# Patient Record
Sex: FEMALE | Race: WHITE | Employment: PART TIME | URBAN - METROPOLITAN AREA
[De-identification: names, ages, dates, MRNs, and addresses within clinical notes are randomized per-mention and may not be internally consistent; named-entity substitution may affect disease eponyms.]

---

## 2017-08-13 ENCOUNTER — APPOINTMENT (OUTPATIENT)
Dept: CT IMAGING | Age: 27
DRG: 871 | End: 2017-08-13
Attending: EMERGENCY MEDICINE
Payer: COMMERCIAL

## 2017-08-13 ENCOUNTER — HOSPITAL ENCOUNTER (INPATIENT)
Age: 27
LOS: 8 days | Discharge: HOME | DRG: 871 | End: 2017-08-22
Attending: EMERGENCY MEDICINE | Admitting: HOSPITALIST
Payer: COMMERCIAL

## 2017-08-13 DIAGNOSIS — R11.2 INTRACTABLE VOMITING WITH NAUSEA, UNSPECIFIED VOMITING TYPE: ICD-10-CM

## 2017-08-13 DIAGNOSIS — K85.90 ACUTE PANCREATITIS WITHOUT INFECTION OR NECROSIS, UNSPECIFIED PANCREATITIS TYPE: Primary | ICD-10-CM

## 2017-08-13 DIAGNOSIS — N30.00 ACUTE CYSTITIS WITHOUT HEMATURIA: ICD-10-CM

## 2017-08-13 LAB
ALBUMIN SERPL BCP-MCNC: 3.2 G/DL (ref 3.5–5)
ALBUMIN/GLOB SERPL: 0.6 {RATIO} (ref 1.2–3.5)
ALP SERPL-CCNC: 64 U/L (ref 50–136)
ALT SERPL-CCNC: 27 U/L (ref 12–65)
ANION GAP BLD CALC-SCNC: 15 MMOL/L (ref 7–16)
AST SERPL W P-5'-P-CCNC: 33 U/L (ref 15–37)
BACTERIA URNS QL MICRO: NORMAL /HPF
BILIRUB SERPL-MCNC: 0.7 MG/DL (ref 0.2–1.1)
BUN SERPL-MCNC: 6 MG/DL (ref 6–23)
CALCIUM SERPL-MCNC: 9.2 MG/DL (ref 8.3–10.4)
CASTS URNS QL MICRO: 0 /LPF
CHLORIDE SERPL-SCNC: 102 MMOL/L (ref 98–107)
CO2 SERPL-SCNC: 20 MMOL/L (ref 21–32)
CREAT BLD-MCNC: 0.6 MG/DL (ref 0.6–1)
CREAT SERPL-MCNC: 0.93 MG/DL (ref 0.6–1)
CRYSTALS URNS QL MICRO: 0 /LPF
DIFFERENTIAL METHOD BLD: ABNORMAL
EPI CELLS #/AREA URNS HPF: NORMAL /HPF
ERYTHROCYTE [DISTWIDTH] IN BLOOD BY AUTOMATED COUNT: 14.5 % (ref 11.9–14.6)
GLOBULIN SER CALC-MCNC: 5.4 G/DL (ref 2.3–3.5)
GLUCOSE SERPL-MCNC: 169 MG/DL (ref 65–100)
HCG UR QL: NEGATIVE
HCT VFR BLD AUTO: 38 % (ref 35.8–46.3)
HGB BLD-MCNC: 13.4 G/DL (ref 11.7–15.4)
LIPASE SERPL-CCNC: 1793 U/L (ref 73–393)
LYMPHOCYTES # BLD: 3.5 K/UL (ref 0.5–4.6)
LYMPHOCYTES NFR BLD MANUAL: 19 % (ref 16–44)
MCH RBC QN AUTO: 28.4 PG (ref 26.1–32.9)
MCHC RBC AUTO-ENTMCNC: 35.3 G/DL (ref 31.4–35)
MCV RBC AUTO: 80.5 FL (ref 79.6–97.8)
MONOCYTES # BLD: 1.7 K/UL (ref 0.1–1.3)
MONOCYTES NFR BLD MANUAL: 9 % (ref 3–9)
MUCOUS THREADS URNS QL MICRO: 0 /LPF
NEUTS BAND NFR BLD MANUAL: 10 % (ref 0–6)
NEUTS SEG # BLD: 13.2 K/UL (ref 1.7–8.2)
NEUTS SEG NFR BLD MANUAL: 62 % (ref 47–75)
OTHER OBSERVATIONS,UCOM: NORMAL
PLATELET # BLD AUTO: 174 K/UL (ref 150–450)
PLATELET COMMENTS,PCOM: ADEQUATE
PMV BLD AUTO: 11.5 FL (ref 10.8–14.1)
POTASSIUM SERPL-SCNC: 3.8 MMOL/L (ref 3.5–5.1)
PROT SERPL-MCNC: 8.6 G/DL (ref 6.3–8.2)
RBC # BLD AUTO: 4.72 M/UL (ref 4.05–5.25)
RBC #/AREA URNS HPF: NORMAL /HPF
RBC MORPH BLD: ABNORMAL
SODIUM SERPL-SCNC: 137 MMOL/L (ref 136–145)
WBC # BLD AUTO: 18.4 K/UL (ref 4.3–11.1)
WBC MORPH BLD: ABNORMAL
WBC URNS QL MICRO: NORMAL /HPF

## 2017-08-13 PROCEDURE — 96361 HYDRATE IV INFUSION ADD-ON: CPT | Performed by: EMERGENCY MEDICINE

## 2017-08-13 PROCEDURE — 85025 COMPLETE CBC W/AUTO DIFF WBC: CPT | Performed by: EMERGENCY MEDICINE

## 2017-08-13 PROCEDURE — 74177 CT ABD & PELVIS W/CONTRAST: CPT

## 2017-08-13 PROCEDURE — 74011000258 HC RX REV CODE- 258: Performed by: EMERGENCY MEDICINE

## 2017-08-13 PROCEDURE — 81015 MICROSCOPIC EXAM OF URINE: CPT | Performed by: EMERGENCY MEDICINE

## 2017-08-13 PROCEDURE — 74011250636 HC RX REV CODE- 250/636: Performed by: EMERGENCY MEDICINE

## 2017-08-13 PROCEDURE — 80053 COMPREHEN METABOLIC PANEL: CPT | Performed by: EMERGENCY MEDICINE

## 2017-08-13 PROCEDURE — 81003 URINALYSIS AUTO W/O SCOPE: CPT | Performed by: EMERGENCY MEDICINE

## 2017-08-13 PROCEDURE — 96365 THER/PROPH/DIAG IV INF INIT: CPT | Performed by: EMERGENCY MEDICINE

## 2017-08-13 PROCEDURE — 96375 TX/PRO/DX INJ NEW DRUG ADDON: CPT | Performed by: EMERGENCY MEDICINE

## 2017-08-13 PROCEDURE — 83690 ASSAY OF LIPASE: CPT | Performed by: EMERGENCY MEDICINE

## 2017-08-13 PROCEDURE — 82565 ASSAY OF CREATININE: CPT

## 2017-08-13 PROCEDURE — 87086 URINE CULTURE/COLONY COUNT: CPT | Performed by: HOSPITALIST

## 2017-08-13 PROCEDURE — 81025 URINE PREGNANCY TEST: CPT

## 2017-08-13 PROCEDURE — 99285 EMERGENCY DEPT VISIT HI MDM: CPT | Performed by: EMERGENCY MEDICINE

## 2017-08-13 PROCEDURE — 74011636320 HC RX REV CODE- 636/320: Performed by: EMERGENCY MEDICINE

## 2017-08-13 RX ORDER — SODIUM CHLORIDE 0.9 % (FLUSH) 0.9 %
10 SYRINGE (ML) INJECTION
Status: COMPLETED | OUTPATIENT
Start: 2017-08-13 | End: 2017-08-13

## 2017-08-13 RX ORDER — ONDANSETRON 2 MG/ML
4 INJECTION INTRAMUSCULAR; INTRAVENOUS
Status: COMPLETED | OUTPATIENT
Start: 2017-08-13 | End: 2017-08-13

## 2017-08-13 RX ORDER — MORPHINE SULFATE 4 MG/ML
4 INJECTION, SOLUTION INTRAMUSCULAR; INTRAVENOUS ONCE
Status: COMPLETED | OUTPATIENT
Start: 2017-08-13 | End: 2017-08-13

## 2017-08-13 RX ORDER — RANITIDINE 150 MG/1
150 TABLET, FILM COATED ORAL 2 TIMES DAILY
Status: ON HOLD | COMMUNITY
End: 2017-08-22

## 2017-08-13 RX ORDER — HYOSCYAMINE SULFATE 0.12 MG/1
0.12 TABLET SUBLINGUAL
COMMUNITY
End: 2017-08-22

## 2017-08-13 RX ORDER — ONDANSETRON 4 MG/1
4 TABLET, FILM COATED ORAL
Status: ON HOLD | COMMUNITY
End: 2017-08-22

## 2017-08-13 RX ADMIN — MORPHINE SULFATE 4 MG: 4 INJECTION, SOLUTION INTRAMUSCULAR; INTRAVENOUS at 21:11

## 2017-08-13 RX ADMIN — ONDANSETRON 4 MG: 2 INJECTION INTRAMUSCULAR; INTRAVENOUS at 21:11

## 2017-08-13 RX ADMIN — SODIUM CHLORIDE 100 ML: 900 INJECTION, SOLUTION INTRAVENOUS at 22:53

## 2017-08-13 RX ADMIN — IOPAMIDOL 100 ML: 755 INJECTION, SOLUTION INTRAVENOUS at 22:53

## 2017-08-13 RX ADMIN — SODIUM CHLORIDE 1000 ML: 900 INJECTION, SOLUTION INTRAVENOUS at 23:30

## 2017-08-13 RX ADMIN — SODIUM CHLORIDE 1000 ML: 900 INJECTION, SOLUTION INTRAVENOUS at 21:11

## 2017-08-13 RX ADMIN — Medication 10 ML: at 22:53

## 2017-08-13 RX ADMIN — CEFTRIAXONE 1 G: 1 INJECTION, POWDER, FOR SOLUTION INTRAMUSCULAR; INTRAVENOUS at 23:45

## 2017-08-13 NOTE — IP AVS SNAPSHOT
Mario Big 
 
 
 [address] 
[phone] Patient: John Fernandez MRN: [id number] EHU:[id number] You are allergic to the following No active allergies Recent Documentation Height Weight Breastfeeding? BMI OB Status Smoking Status 1.626 m 99.2 kg No 37.52 kg/m2 Having regular periods Never Smoker About your hospitalization You were admitted on:  August 14, 2017 You last received care in the:  [address] You were discharged on:  August 22, 2017 Unit phone number:  [phone] Why you were hospitalized Your primary diagnosis was:  Acute Pancreatitis Your diagnoses also included:  Leukocytosis, Nausea And Vomiting, Uti (Urinary Tract Infection), Gerd (Gastroesophageal Reflux Disease) Providers Seen During Your Hospitalizations Provider Role Specialty Primary office phone Bill Plata MD Attending Provider Emergency Medicine [phone] Sterling Torrez MD Attending Provider Internal Medicine [phone] Your Primary Care Physician (PCP) Primary Care Physician Office Phone Office Fax NONE ** None ** ** None ** Follow-up Information Follow up With Details Comments Contact Info [address] On 8/30/2017 1:40pm arrive at 1:20 304 [facility] [address] 
[phone] [facility] In 1 week OFFICE WILL CALL WITH APPT. [facility] [address] [place] [address] [phone] Viviana oMrales MD  OFFICE WILL CALL PATIENT WITH APPT. [address] Zachary Richard [address] [facility] [address] 
[phone] Your Appointments Wednesday August 30, 2017  1:40 PM EDT New Patient with Jael Ricardo DO  
[facility] ([address]) [address] [facility] [address] [phone] Current Discharge Medication List  
  
START taking these medications Dose & Instructions Dispensing Information Comments Morning Noon Evening Bedtime  
 ciprofloxacin HCl 500 mg tablet Commonly known as:  CIPRO Your next dose is: Today Dose:  500 mg Take 1 Tab by mouth every twelve (12) hours. Quantity:  12 Tab Refills:  0  
     
   
   
  
   
  
 gemfibrozil 600 mg tablet Commonly known as:  LOPID Your next dose is:  Tomorrow Dose:  600 mg Take 1 Tab by mouth Before breakfast and dinner. Quantity:  60 Tab Refills:  0  
     
   
   
   
  
 metroNIDAZOLE 500 mg tablet Commonly known as:  FLAGYL Your next dose is: Today Dose:  500 mg Take 1 Tab by mouth two (2) times a day. Quantity:  18 Tab Refills:  0 Saccharomyces boulardii 250 mg capsule Commonly known as:  Antelmo Controls Your next dose is: Today Dose:  250 mg Take 1 Cap by mouth two (2) times a day for 7 days. Quantity:  14 Cap Refills:  0 CONTINUE these medications which have NOT CHANGED Dose & Instructions Dispensing Information Comments Morning Noon Evening Bedtime  
 ondansetron hcl 4 mg tablet Commonly known as:  ZOFRAN (AS HYDROCHLORIDE) Dose:  4 mg Take 1 Tab by mouth every eight (8) hours as needed for Nausea. Quantity:  30 Tab Refills:  0  
     
   
   
   
  
 ORTHO TRI-CYCLEN (28) 0.18/0.215/0.25 mg-35 mcg (28) Tab Generic drug:  norgestimate-ethinyl estradiol Dose:  1 Tab Take 1 Tab by mouth daily. Refills:  0  
     
   
   
   
  
 oxyCODONE IR 5 mg immediate release tablet Commonly known as:  Michelle Catalan Dose:  5 mg Take 1 Tab by mouth every six (6) hours as needed for Pain. Quantity:  30 Tab Refills:  0  
     
   
   
   
  
 raNITIdine 150 mg tablet Commonly known as:  ZANTAC Dose:  150 mg Take 1 Tab by mouth two (2) times a day. Quantity:  60 Tab Refills:  0 ZyrTEC 10 mg tablet Generic drug:  cetirizine Dose:  10 mg Take 10 mg by mouth daily. Refills:  0 STOP taking these medications BACTRIM -800 mg per tablet Generic drug:  trimethoprim-sulfamethoxazole BROMFED DM 2-30-10 mg/5 mL syrup Generic drug:  Brompheniramine-Pseudoeph-DM CEFTIN 250 mg tablet Generic drug:  cefUROXime  
   
  
 hyoscyamine SL 0.125 mg SL tablet Commonly known as:  LEVSIN/SL  
   
  
 predniSONE 10 mg tablet Commonly known as:  DELTASONE  
   
  
 promethazine 25 mg tablet Commonly known as:  PHENERGAN  
   
  
 ZITHROMAX Z-SUMMER 250 mg tablet Generic drug:  azithromycin Where to Get Your Medications Information on where to get these meds will be given to you by the nurse or doctor. ! Ask your nurse or doctor about these medications  
  ciprofloxacin HCl 500 mg tablet  
 gemfibrozil 600 mg tablet  
 metroNIDAZOLE 500 mg tablet  
 ondansetron hcl 4 mg tablet  
 oxyCODONE IR 5 mg immediate release tablet  
 raNITIdine 150 mg tablet Saccharomyces boulardii 250 mg capsule Discharge Instructions DISCHARGE SUMMARY from Nurse The following personal items are in your possession at time of discharge: 
 
Dental Appliances: None Visual Aid: None Home Medications: Kept at bedside Jewelry: Watch Clothing: Footwear, Pajamas, Undergarments Other Valuables: Cell Phone PATIENT INSTRUCTIONS: 
 
After general anesthesia or intravenous sedation, for 24 hours or while taking prescription Narcotics: · Limit your activities · Do not drive and operate hazardous machinery · Do not make important personal or business decisions · Do  not drink alcoholic beverages · If you have not urinated within 8 hours after discharge, please contact your surgeon on call. Report the following to your surgeon: · Excessive pain, swelling, redness or odor of or around the surgical area · Temperature over 100.5 · Nausea and vomiting lasting longer than 4 hours or if unable to take medications · Any signs of decreased circulation or nerve impairment to extremity: change in color, persistent  numbness, tingling, coldness or increase pain · Any questions What to do at Home: 
Recommended activity: Activity as tolerated, per MD 
 
If you experience any of the following symptoms fever>101, pain unrelieved with medication, nausea/vomiting, shortness of breath, dizziness/fainting, chest pain. , please follow up with your doctor. *  Please give a list of your current medications to your Primary Care Provider. *  Please update this list whenever your medications are discontinued, doses are 
    changed, or new medications (including over-the-counter products) are added. *  Please carry medication information at all times in case of emergency situations. These are general instructions for a healthy lifestyle: No smoking/ No tobacco products/ Avoid exposure to second hand smoke Surgeon General's Warning:  Quitting smoking now greatly reduces serious risk to your health. Obesity, smoking, and sedentary lifestyle greatly increases your risk for illness A healthy diet, regular physical exercise & weight monitoring are important for maintaining a healthy lifestyle You may be retaining fluid if you have a history of heart failure or if you experience any of the following symptoms:  Weight gain of 3 pounds or more overnight or 5 pounds in a week, increased swelling in our hands or feet or shortness of breath while lying flat in bed. Please call your doctor as soon as you notice any of these symptoms; do not wait until your next office visit. Recognize signs and symptoms of STROKE: 
 
F-face looks uneven A-arms unable to move or move unevenly S-speech slurred or non-existent T-time-call 911 as soon as signs and symptoms begin-DO NOT go Back to bed or wait to see if you get better-TIME IS BRAIN. Warning Signs of HEART ATTACK Call 911 if you have these symptoms: 
? Chest discomfort. Most heart attacks involve discomfort in the center of the chest that lasts more than a few minutes, or that goes away and comes back. It can feel like uncomfortable pressure, squeezing, fullness, or pain. ? Discomfort in other areas of the upper body. Symptoms can include pain or discomfort in one or both arms, the back, neck, jaw, or stomach. ? Shortness of breath with or without chest discomfort. ? Other signs may include breaking out in a cold sweat, nausea, or lightheadedness. Don't wait more than five minutes to call [address]! Fast action can save your life. Calling 911 is almost always the fastest way to get lifesaving treatment. Emergency Medical Services staff can begin treatment when they arrive  up to an hour sooner than if someone gets to the hospital by car. The discharge information has been reviewed with the patient. The patient verbalized understanding. Discharge medications reviewed with the patient and appropriate educational materials and side effects teaching were provided. Pancreatitis: Care Instructions Your Care Instructions The pancreas is an organ behind the stomach. It makes hormones and enzymes to help your body digest food. But if these enzymes attack the pancreas, it can get inflamed. This is called pancreatitis. Most cases are caused by gallstones or by heavy alcohol use. If you take care of yourself at home, it will help you get better. It will also help you avoid more problems with your pancreas. Follow-up care is a key part of your treatment and safety. Be sure to make and go to all appointments, and call your doctor if you are having problems.  It's also a good idea to know your test results and keep a list of the medicines you take. How can you care for yourself at home? · Drink clear liquids and eat bland foods until you feel better. Porter foods include rice, dry toast, and crackers. They also include bananas and applesauce. · Eat a low-fat diet until your doctor says your pancreas is healed. · Do not drink alcohol. Tell your doctor if you need help to quit. Counseling, support groups, and sometimes medicines can help you stay sober. · Be safe with medicines. Read and follow all instructions on the label. ¨ If the doctor gave you a prescription medicine for pain, take it as prescribed. ¨ If you are not taking a prescription pain medicine, ask your doctor if you can take an over-the-counter medicine. · If your doctor prescribed antibiotics, take them as directed. Do not stop taking them just because you feel better. You need to take the full course of antibiotics. · Get extra rest until you feel better. To prevent future problems with your pancreas · Do not drink alcohol. · Tell your doctors and pharmacist that you've had pancreatitis. They can help you avoid medicines that may cause this problem again. When should you call for help? Call your doctor now or seek immediate medical care if: 
· You have new or severe belly pain. · You have a new or higher fever. · You can't keep fluid or medicines down. Watch closely for changes in your health, and be sure to contact your doctor if: · The symptoms you had when you first started feeling sick come back. · You do not get better as expected. · You need help to stop drinking alcohol. Where can you learn more? Go to http://thee-kenna.info/. Enter C804 in the search box to learn more about \"Pancreatitis: Care Instructions. \" Current as of: August 9, 2016 Content Version: 11.3 © 9334-1796 [employer].  Care instructions adapted under license by [employer] (which disclaims liability or warranty for this information). If you have questions about a medical condition or this instruction, always ask your healthcare professional. [place] [address] any warranty or liability for your use of this information. Discharge Orders Procedure Order Date Status Priority Quantity Spec Type Associated Dx METABOLIC PANEL, COMPREHENSIVE 08/22/17 0926 Future Routine 1 Serum Comments:  Do it before next appointment CBC WITH AUTOMATED DIFF 08/22/17 0926 Future Routine 1 Blood Comments:  Do blood workup before next appointment LIPID PANEL 08/22/17 0926 Future Routine 1 Serum Comments:  Before next appointment Introducing [facility]! Dorota Farias introduces [facility] patient portal. Now you can access parts of your medical record, email your doctor's office, and request medication refills online. 1. In your internet browser, go to [email or web address] 2. Click on the First Time User? Click Here link in the Sign In box. You will see the New Member Sign Up page. 3. Enter your [facility] Access Code exactly as it appears below. You will not need to use this code after youve completed the sign-up process. If you do not sign up before the expiration date, you must request a new code. · [facility] Access Code: [email or web address] Expires: 11/11/2017  7:17 PM 
 
4. Enter the last four digits of your Social Security Number (xxxx) and Date of Birth (mm/dd/yyyy) as indicated and click Submit. You will be taken to the next sign-up page. 5. Create a [facility] ID. This will be your [facility] login ID and cannot be changed, so think of one that is secure and easy to remember. 6. Create a [facility] password. You can change your password at any time. 7. Enter your Password Reset Question and Answer. This can be used at a later time if you forget your password. 8. Enter your e-mail address.  You will receive e-mail notification when new information is available in [employer]. 9. Click Sign Up. You can now view and download portions of your medical record. 10. Click the Download Summary menu link to download a portable copy of your medical information. If you have questions, please visit the Frequently Asked Questions section of the [employer] website. Remember, [employer] is NOT to be used for urgent needs. For medical emergencies, dial 911. Now available from your iPhone and Android! General Information Please provide this summary of care documentation to your next provider. Patient Signature:  ____________________________________________________________ Date:  ____________________________________________________________  
  
[facility]:  ____________________________________________________________ Date:  ____________________________________________________________

## 2017-08-14 PROBLEM — K21.9 GERD (GASTROESOPHAGEAL REFLUX DISEASE): Status: ACTIVE | Noted: 2017-08-14

## 2017-08-14 PROBLEM — N39.0 UTI (URINARY TRACT INFECTION): Status: ACTIVE | Noted: 2017-08-14

## 2017-08-14 PROBLEM — D72.829 LEUKOCYTOSIS: Status: ACTIVE | Noted: 2017-08-14

## 2017-08-14 PROBLEM — R11.2 NAUSEA AND VOMITING: Status: ACTIVE | Noted: 2017-08-14

## 2017-08-14 PROBLEM — K85.90 ACUTE PANCREATITIS: Status: ACTIVE | Noted: 2017-08-14

## 2017-08-14 LAB
CHOLEST SERPL-MCNC: 482 MG/DL
CRP SERPL-MCNC: 7 MG/DL (ref 0–0.9)
EST. AVERAGE GLUCOSE BLD GHB EST-MCNC: 134 MG/DL
GLUCOSE BLD STRIP.AUTO-MCNC: 125 MG/DL (ref 65–100)
GLUCOSE BLD STRIP.AUTO-MCNC: 126 MG/DL (ref 65–100)
GLUCOSE BLD STRIP.AUTO-MCNC: 128 MG/DL (ref 65–100)
GLUCOSE BLD STRIP.AUTO-MCNC: 133 MG/DL (ref 65–100)
GLUCOSE BLD STRIP.AUTO-MCNC: 135 MG/DL (ref 65–100)
GLUCOSE BLD STRIP.AUTO-MCNC: 136 MG/DL (ref 65–100)
GLUCOSE BLD STRIP.AUTO-MCNC: 144 MG/DL (ref 65–100)
GLUCOSE BLD STRIP.AUTO-MCNC: 148 MG/DL (ref 65–100)
GLUCOSE BLD STRIP.AUTO-MCNC: 161 MG/DL (ref 65–100)
HBA1C MFR BLD: 6.3 % (ref 4.8–6)
HDLC SERPL-MCNC: 37 MG/DL (ref 40–60)
HDLC SERPL: 13 {RATIO}
LACTATE SERPL-SCNC: 1 MMOL/L (ref 0.4–2)
LDH SERPL L TO P-CCNC: 169 U/L (ref 100–190)
LDLC SERPL CALC-MCNC: ABNORMAL MG/DL
LDLC SERPL DIRECT ASSAY-MCNC: 66 MG/DL
LIPID PROFILE,FLP: ABNORMAL
MAGNESIUM SERPL-MCNC: 1.4 MG/DL (ref 1.8–2.4)
PHOSPHATE SERPL-MCNC: 2.8 MG/DL (ref 2.5–4.5)
T4 FREE SERPL-MCNC: 1 NG/DL (ref 0.78–1.46)
TRIGL SERPL-MCNC: 2520 MG/DL (ref 35–150)
TRIGL SERPL-MCNC: 3595 MG/DL (ref 35–150)
TSH SERPL DL<=0.005 MIU/L-ACNC: 1.91 UIU/ML (ref 0.36–3.74)
VLDLC SERPL CALC-MCNC: 719 MG/DL (ref 6–23)

## 2017-08-14 PROCEDURE — 74011250636 HC RX REV CODE- 250/636: Performed by: HOSPITALIST

## 2017-08-14 PROCEDURE — 83721 ASSAY OF BLOOD LIPOPROTEIN: CPT | Performed by: HOSPITALIST

## 2017-08-14 PROCEDURE — 82962 GLUCOSE BLOOD TEST: CPT

## 2017-08-14 PROCEDURE — 83615 LACTATE (LD) (LDH) ENZYME: CPT | Performed by: HOSPITALIST

## 2017-08-14 PROCEDURE — 84443 ASSAY THYROID STIM HORMONE: CPT | Performed by: HOSPITALIST

## 2017-08-14 PROCEDURE — 36415 COLL VENOUS BLD VENIPUNCTURE: CPT | Performed by: HOSPITALIST

## 2017-08-14 PROCEDURE — 83735 ASSAY OF MAGNESIUM: CPT | Performed by: HOSPITALIST

## 2017-08-14 PROCEDURE — 86140 C-REACTIVE PROTEIN: CPT | Performed by: HOSPITALIST

## 2017-08-14 PROCEDURE — 65270000029 HC RM PRIVATE

## 2017-08-14 PROCEDURE — 74011636637 HC RX REV CODE- 636/637: Performed by: HOSPITALIST

## 2017-08-14 PROCEDURE — 84478 ASSAY OF TRIGLYCERIDES: CPT | Performed by: HOSPITALIST

## 2017-08-14 PROCEDURE — 74011000258 HC RX REV CODE- 258: Performed by: HOSPITALIST

## 2017-08-14 PROCEDURE — 74011250637 HC RX REV CODE- 250/637: Performed by: HOSPITALIST

## 2017-08-14 PROCEDURE — 84100 ASSAY OF PHOSPHORUS: CPT | Performed by: HOSPITALIST

## 2017-08-14 PROCEDURE — 74011000250 HC RX REV CODE- 250: Performed by: HOSPITALIST

## 2017-08-14 PROCEDURE — 83036 HEMOGLOBIN GLYCOSYLATED A1C: CPT | Performed by: HOSPITALIST

## 2017-08-14 PROCEDURE — 74011250636 HC RX REV CODE- 250/636: Performed by: EMERGENCY MEDICINE

## 2017-08-14 PROCEDURE — 83605 ASSAY OF LACTIC ACID: CPT | Performed by: HOSPITALIST

## 2017-08-14 PROCEDURE — 80061 LIPID PANEL: CPT | Performed by: HOSPITALIST

## 2017-08-14 PROCEDURE — 84439 ASSAY OF FREE THYROXINE: CPT | Performed by: HOSPITALIST

## 2017-08-14 RX ORDER — SODIUM CHLORIDE 0.9 % (FLUSH) 0.9 %
5-10 SYRINGE (ML) INJECTION AS NEEDED
Status: DISCONTINUED | OUTPATIENT
Start: 2017-08-14 | End: 2017-08-22 | Stop reason: HOSPADM

## 2017-08-14 RX ORDER — PROMETHAZINE HYDROCHLORIDE 25 MG/1
25 TABLET ORAL
Status: CANCELLED | OUTPATIENT
Start: 2017-08-14

## 2017-08-14 RX ORDER — NORGESTIMATE AND ETHINYL ESTRADIOL 7DAYSX3 28
1 KIT ORAL DAILY
COMMUNITY
End: 2018-07-12 | Stop reason: SDUPTHER

## 2017-08-14 RX ORDER — MAGNESIUM SULFATE 1 G/100ML
1 INJECTION INTRAVENOUS
Status: COMPLETED | OUTPATIENT
Start: 2017-08-14 | End: 2017-08-14

## 2017-08-14 RX ORDER — NALOXONE HYDROCHLORIDE 0.4 MG/ML
0.4 INJECTION, SOLUTION INTRAMUSCULAR; INTRAVENOUS; SUBCUTANEOUS AS NEEDED
Status: CANCELLED | OUTPATIENT
Start: 2017-08-14

## 2017-08-14 RX ORDER — SODIUM CHLORIDE 0.9 % (FLUSH) 0.9 %
5-10 SYRINGE (ML) INJECTION AS NEEDED
Status: CANCELLED | OUTPATIENT
Start: 2017-08-14

## 2017-08-14 RX ORDER — INSULIN LISPRO 100 [IU]/ML
INJECTION, SOLUTION INTRAVENOUS; SUBCUTANEOUS EVERY 6 HOURS
Status: CANCELLED | OUTPATIENT
Start: 2017-08-14

## 2017-08-14 RX ORDER — SODIUM CHLORIDE 0.9 % (FLUSH) 0.9 %
5-10 SYRINGE (ML) INJECTION EVERY 8 HOURS
Status: DISCONTINUED | OUTPATIENT
Start: 2017-08-14 | End: 2017-08-22 | Stop reason: HOSPADM

## 2017-08-14 RX ORDER — GEMFIBROZIL 600 MG/1
600 TABLET, FILM COATED ORAL
Status: DISCONTINUED | OUTPATIENT
Start: 2017-08-14 | End: 2017-08-22 | Stop reason: HOSPADM

## 2017-08-14 RX ORDER — DEXTROSE 40 %
15 GEL (GRAM) ORAL AS NEEDED
Status: CANCELLED | OUTPATIENT
Start: 2017-08-14

## 2017-08-14 RX ORDER — BISACODYL 5 MG
5 TABLET, DELAYED RELEASE (ENTERIC COATED) ORAL DAILY PRN
Status: DISCONTINUED | OUTPATIENT
Start: 2017-08-14 | End: 2017-08-22 | Stop reason: HOSPADM

## 2017-08-14 RX ORDER — DEXTROSE 40 %
15 GEL (GRAM) ORAL AS NEEDED
Status: DISCONTINUED | OUTPATIENT
Start: 2017-08-14 | End: 2017-08-22 | Stop reason: HOSPADM

## 2017-08-14 RX ORDER — DOCUSATE SODIUM 100 MG/1
100 CAPSULE, LIQUID FILLED ORAL DAILY
Status: DISCONTINUED | OUTPATIENT
Start: 2017-08-14 | End: 2017-08-22 | Stop reason: HOSPADM

## 2017-08-14 RX ORDER — ACETAMINOPHEN 325 MG/1
650 TABLET ORAL
Status: DISCONTINUED | OUTPATIENT
Start: 2017-08-14 | End: 2017-08-22 | Stop reason: HOSPADM

## 2017-08-14 RX ORDER — ONDANSETRON HYDROCHLORIDE 4 MG/5ML
4 SOLUTION ORAL
Status: CANCELLED | OUTPATIENT
Start: 2017-08-14

## 2017-08-14 RX ORDER — FACIAL-BODY WIPES
10 EACH TOPICAL DAILY PRN
Status: DISCONTINUED | OUTPATIENT
Start: 2017-08-14 | End: 2017-08-18 | Stop reason: SDUPTHER

## 2017-08-14 RX ORDER — SODIUM CHLORIDE 9 MG/ML
150 INJECTION, SOLUTION INTRAVENOUS CONTINUOUS
Status: CANCELLED | OUTPATIENT
Start: 2017-08-14

## 2017-08-14 RX ORDER — HYOSCYAMINE SULFATE 0.12 MG/1
0.12 TABLET SUBLINGUAL
Status: CANCELLED | OUTPATIENT
Start: 2017-08-14

## 2017-08-14 RX ORDER — ONDANSETRON 2 MG/ML
4 INJECTION INTRAMUSCULAR; INTRAVENOUS
Status: DISCONTINUED | OUTPATIENT
Start: 2017-08-14 | End: 2017-08-21

## 2017-08-14 RX ORDER — DEXTROSE 50 % IN WATER (D50W) INTRAVENOUS SYRINGE
25-50 AS NEEDED
Status: DISCONTINUED | OUTPATIENT
Start: 2017-08-14 | End: 2017-08-22 | Stop reason: HOSPADM

## 2017-08-14 RX ORDER — HYDROMORPHONE HYDROCHLORIDE 1 MG/ML
1 INJECTION, SOLUTION INTRAMUSCULAR; INTRAVENOUS; SUBCUTANEOUS
Status: CANCELLED | OUTPATIENT
Start: 2017-08-14

## 2017-08-14 RX ORDER — HYDROMORPHONE HYDROCHLORIDE 1 MG/ML
1 INJECTION, SOLUTION INTRAMUSCULAR; INTRAVENOUS; SUBCUTANEOUS
Status: COMPLETED | OUTPATIENT
Start: 2017-08-14 | End: 2017-08-14

## 2017-08-14 RX ORDER — ADHESIVE BANDAGE
30 BANDAGE TOPICAL DAILY PRN
Status: CANCELLED | OUTPATIENT
Start: 2017-08-14 | Stop reason: SDUPTHER

## 2017-08-14 RX ORDER — HYDROMORPHONE HYDROCHLORIDE 1 MG/ML
1 INJECTION, SOLUTION INTRAMUSCULAR; INTRAVENOUS; SUBCUTANEOUS
Status: DISCONTINUED | OUTPATIENT
Start: 2017-08-14 | End: 2017-08-14

## 2017-08-14 RX ORDER — HYDROMORPHONE HYDROCHLORIDE 1 MG/ML
1 INJECTION, SOLUTION INTRAMUSCULAR; INTRAVENOUS; SUBCUTANEOUS
Status: DISCONTINUED | OUTPATIENT
Start: 2017-08-14 | End: 2017-08-15

## 2017-08-14 RX ORDER — NALOXONE HYDROCHLORIDE 0.4 MG/ML
0.4 INJECTION, SOLUTION INTRAMUSCULAR; INTRAVENOUS; SUBCUTANEOUS AS NEEDED
Status: DISCONTINUED | OUTPATIENT
Start: 2017-08-14 | End: 2017-08-22 | Stop reason: HOSPADM

## 2017-08-14 RX ORDER — DEXTROSE, SODIUM CHLORIDE, AND POTASSIUM CHLORIDE 5; .9; .15 G/100ML; G/100ML; G/100ML
50 INJECTION INTRAVENOUS CONTINUOUS
Status: CANCELLED | OUTPATIENT
Start: 2017-08-14

## 2017-08-14 RX ORDER — ACETAMINOPHEN 325 MG/1
650 TABLET ORAL
Status: CANCELLED | OUTPATIENT
Start: 2017-08-14

## 2017-08-14 RX ORDER — SODIUM CHLORIDE 0.9 % (FLUSH) 0.9 %
5-10 SYRINGE (ML) INJECTION EVERY 8 HOURS
Status: CANCELLED | OUTPATIENT
Start: 2017-08-14

## 2017-08-14 RX ORDER — ONDANSETRON 2 MG/ML
INJECTION INTRAMUSCULAR; INTRAVENOUS
Status: ACTIVE
Start: 2017-08-14 | End: 2017-08-14

## 2017-08-14 RX ORDER — ONDANSETRON 2 MG/ML
4 INJECTION INTRAMUSCULAR; INTRAVENOUS
Status: CANCELLED | OUTPATIENT
Start: 2017-08-14

## 2017-08-14 RX ORDER — GEMFIBROZIL 600 MG/1
600 TABLET, FILM COATED ORAL
Status: CANCELLED | OUTPATIENT
Start: 2017-08-14

## 2017-08-14 RX ORDER — INSULIN LISPRO 100 [IU]/ML
INJECTION, SOLUTION INTRAVENOUS; SUBCUTANEOUS EVERY 6 HOURS
Status: DISCONTINUED | OUTPATIENT
Start: 2017-08-14 | End: 2017-08-22 | Stop reason: HOSPADM

## 2017-08-14 RX ORDER — OXYCODONE AND ACETAMINOPHEN 5; 325 MG/1; MG/1
1 TABLET ORAL
Status: DISCONTINUED | OUTPATIENT
Start: 2017-08-14 | End: 2017-08-22 | Stop reason: HOSPADM

## 2017-08-14 RX ORDER — SODIUM CHLORIDE 9 MG/ML
150 INJECTION, SOLUTION INTRAVENOUS CONTINUOUS
Status: DISCONTINUED | OUTPATIENT
Start: 2017-08-14 | End: 2017-08-21

## 2017-08-14 RX ORDER — HYDROMORPHONE HYDROCHLORIDE 1 MG/ML
0.5 INJECTION, SOLUTION INTRAMUSCULAR; INTRAVENOUS; SUBCUTANEOUS
Status: DISCONTINUED | OUTPATIENT
Start: 2017-08-14 | End: 2017-08-14

## 2017-08-14 RX ORDER — CETIRIZINE HCL 10 MG
10 TABLET ORAL DAILY
COMMUNITY

## 2017-08-14 RX ORDER — ADHESIVE BANDAGE
30 BANDAGE TOPICAL DAILY PRN
Status: DISCONTINUED | OUTPATIENT
Start: 2017-08-14 | End: 2017-08-22 | Stop reason: HOSPADM

## 2017-08-14 RX ORDER — DOCUSATE SODIUM 100 MG/1
100 CAPSULE, LIQUID FILLED ORAL DAILY
Status: CANCELLED | OUTPATIENT
Start: 2017-08-15

## 2017-08-14 RX ORDER — BISACODYL 5 MG
5 TABLET, DELAYED RELEASE (ENTERIC COATED) ORAL DAILY PRN
Status: CANCELLED | OUTPATIENT
Start: 2017-08-14 | Stop reason: SDUPTHER

## 2017-08-14 RX ORDER — DEXTROSE, SODIUM CHLORIDE, AND POTASSIUM CHLORIDE 5; .9; .15 G/100ML; G/100ML; G/100ML
50 INJECTION INTRAVENOUS CONTINUOUS
Status: DISCONTINUED | OUTPATIENT
Start: 2017-08-14 | End: 2017-08-16

## 2017-08-14 RX ORDER — OXYCODONE AND ACETAMINOPHEN 5; 325 MG/1; MG/1
1 TABLET ORAL
Status: CANCELLED | OUTPATIENT
Start: 2017-08-14

## 2017-08-14 RX ORDER — FACIAL-BODY WIPES
10 EACH TOPICAL DAILY PRN
Status: CANCELLED | OUTPATIENT
Start: 2017-08-14 | Stop reason: SDUPTHER

## 2017-08-14 RX ORDER — DEXTROSE 50 % IN WATER (D50W) INTRAVENOUS SYRINGE
25-50 AS NEEDED
Status: CANCELLED | OUTPATIENT
Start: 2017-08-14

## 2017-08-14 RX ADMIN — MAGNESIUM SULFATE HEPTAHYDRATE 1 G: 1 INJECTION, SOLUTION INTRAVENOUS at 05:02

## 2017-08-14 RX ADMIN — FAMOTIDINE 20 MG: 10 INJECTION, SOLUTION INTRAVENOUS at 20:47

## 2017-08-14 RX ADMIN — ONDANSETRON 4 MG: 2 INJECTION INTRAMUSCULAR; INTRAVENOUS at 06:12

## 2017-08-14 RX ADMIN — HYDROMORPHONE HYDROCHLORIDE 1 MG: 1 INJECTION, SOLUTION INTRAMUSCULAR; INTRAVENOUS; SUBCUTANEOUS at 00:55

## 2017-08-14 RX ADMIN — Medication 5 ML: at 06:13

## 2017-08-14 RX ADMIN — HYDROMORPHONE HYDROCHLORIDE 1 MG: 1 INJECTION, SOLUTION INTRAMUSCULAR; INTRAVENOUS; SUBCUTANEOUS at 11:10

## 2017-08-14 RX ADMIN — GEMFIBROZIL 600 MG: 600 TABLET ORAL at 16:12

## 2017-08-14 RX ADMIN — MAGNESIUM SULFATE HEPTAHYDRATE 1 G: 1 INJECTION, SOLUTION INTRAVENOUS at 03:33

## 2017-08-14 RX ADMIN — ONDANSETRON 4 MG: 2 INJECTION INTRAMUSCULAR; INTRAVENOUS at 01:00

## 2017-08-14 RX ADMIN — INSULIN LISPRO 2 UNITS: 100 INJECTION, SOLUTION INTRAVENOUS; SUBCUTANEOUS at 06:28

## 2017-08-14 RX ADMIN — OXYCODONE HYDROCHLORIDE AND ACETAMINOPHEN 1 TABLET: 5; 325 TABLET ORAL at 20:47

## 2017-08-14 RX ADMIN — MAGNESIUM SULFATE HEPTAHYDRATE 1 G: 1 INJECTION, SOLUTION INTRAVENOUS at 06:10

## 2017-08-14 RX ADMIN — HYDROMORPHONE HYDROCHLORIDE 0.5 MG: 1 INJECTION, SOLUTION INTRAMUSCULAR; INTRAVENOUS; SUBCUTANEOUS at 07:25

## 2017-08-14 RX ADMIN — ONDANSETRON 4 MG: 2 INJECTION INTRAMUSCULAR; INTRAVENOUS at 20:56

## 2017-08-14 RX ADMIN — GEMFIBROZIL 600 MG: 600 TABLET ORAL at 07:25

## 2017-08-14 RX ADMIN — FAMOTIDINE 20 MG: 10 INJECTION, SOLUTION INTRAVENOUS at 02:12

## 2017-08-14 RX ADMIN — Medication 10 ML: at 02:11

## 2017-08-14 RX ADMIN — Medication 10 ML: at 23:40

## 2017-08-14 RX ADMIN — HYDROMORPHONE HYDROCHLORIDE 1 MG: 1 INJECTION, SOLUTION INTRAMUSCULAR; INTRAVENOUS; SUBCUTANEOUS at 14:45

## 2017-08-14 RX ADMIN — HYDROMORPHONE HYDROCHLORIDE 1 MG: 1 INJECTION, SOLUTION INTRAMUSCULAR; INTRAVENOUS; SUBCUTANEOUS at 17:44

## 2017-08-14 RX ADMIN — SODIUM CHLORIDE 150 ML/HR: 900 INJECTION, SOLUTION INTRAVENOUS at 01:49

## 2017-08-14 RX ADMIN — FAMOTIDINE 20 MG: 10 INJECTION, SOLUTION INTRAVENOUS at 08:57

## 2017-08-14 RX ADMIN — DEXTROSE MONOHYDRATE, SODIUM CHLORIDE, AND POTASSIUM CHLORIDE 50 ML/HR: 50; 9; 1.49 INJECTION, SOLUTION INTRAVENOUS at 08:52

## 2017-08-14 RX ADMIN — HYDROMORPHONE HYDROCHLORIDE 1 MG: 1 INJECTION, SOLUTION INTRAMUSCULAR; INTRAVENOUS; SUBCUTANEOUS at 23:28

## 2017-08-14 RX ADMIN — DOCUSATE SODIUM 100 MG: 100 CAPSULE, LIQUID FILLED ORAL at 08:57

## 2017-08-14 RX ADMIN — CEFTRIAXONE 1 G: 1 INJECTION, POWDER, FOR SOLUTION INTRAMUSCULAR; INTRAVENOUS at 23:28

## 2017-08-14 RX ADMIN — SODIUM CHLORIDE 2 UNITS/HR: 900 INJECTION, SOLUTION INTRAVENOUS at 08:52

## 2017-08-14 RX ADMIN — SODIUM CHLORIDE 12.5 MG: 9 INJECTION INTRAMUSCULAR; INTRAVENOUS; SUBCUTANEOUS at 03:40

## 2017-08-14 RX ADMIN — HYDROMORPHONE HYDROCHLORIDE 0.5 MG: 1 INJECTION, SOLUTION INTRAMUSCULAR; INTRAVENOUS; SUBCUTANEOUS at 03:32

## 2017-08-14 NOTE — ED PROVIDER NOTES
HPI Comments: 25-year-old female with history of GERD presents with complaint of epigastric pain with persistent nausea and vomiting since this morning. Rates the pain as sharp, constant, with radiation to her back. Denies any alleviating factors. Denies history of pancreatitis. Patient denies history of alcohol use. She denies trauma or injury. Patient denies fever, chills, dysuria, hematuria, diarrhea, constipation, chest pain, shortness of breath, vaginal bleeding, vaginal discharge. Patient is a 27 y.o. female presenting with abdominal pain. The history is provided by the patient. No  was used. Abdominal Pain    This is a new problem. The current episode started 12 to 24 hours ago. The problem occurs constantly. The problem has not changed since onset. The pain is associated with vomiting. The pain is located in the generalized abdominal region and epigastric region. The quality of the pain is cramping. The pain is at a severity of 3/10. The pain is mild. Associated symptoms include nausea and vomiting. Pertinent negatives include no fever, no belching, no diarrhea, no flatus, no hematochezia, no melena, no constipation, no dysuria, no frequency, no hematuria, no headaches, no trauma, no chest pain and no back pain. Nothing worsens the pain. The pain is relieved by nothing. Her past medical history is significant for GERD. Past Medical History:   Diagnosis Date    Allergic rhinitis due to pollen     Chronic maxillary sinusitis     Kidney stones        Past Surgical History:   Procedure Laterality Date    HX ORTHOPAEDIC      right knee    HX OTHER SURGICAL      bone tumor right great toe         History reviewed. No pertinent family history. Social History     Social History    Marital status: SINGLE     Spouse name: N/A    Number of children: N/A    Years of education: N/A     Occupational History    Not on file.      Social History Main Topics    Smoking status: Never Smoker    Smokeless tobacco: Not on file    Alcohol use Yes      Comment: occ    Drug use: No    Sexual activity: Not on file     Other Topics Concern    Not on file     Social History Narrative         ALLERGIES: Review of patient's allergies indicates no known allergies. Review of Systems   Constitutional: Negative for chills, fatigue and fever. HENT: Negative for congestion and rhinorrhea. Eyes: Negative for pain and redness. Respiratory: Negative for cough and shortness of breath. Cardiovascular: Negative for chest pain, palpitations and leg swelling. Gastrointestinal: Positive for abdominal pain, nausea and vomiting. Negative for abdominal distention, anal bleeding, blood in stool, constipation, diarrhea, flatus, hematochezia and melena. Endocrine: Negative for polydipsia and polyphagia. Genitourinary: Negative for dysuria, flank pain, frequency, genital sores, hematuria, pelvic pain, vaginal bleeding and vaginal discharge. Musculoskeletal: Negative for back pain, gait problem, joint swelling, neck pain and neck stiffness. Skin: Negative for pallor and rash. Neurological: Negative for dizziness, seizures, syncope, weakness, numbness and headaches. Psychiatric/Behavioral: Negative for agitation and confusion. Vitals:    08/13/17 2220 08/13/17 2241 08/13/17 2320 08/14/17 0022   BP: 115/68 118/59 128/69    Pulse:    90   SpO2: 96% 100% 100%             Physical Exam   Constitutional: She is oriented to person, place, and time. She appears well-developed and well-nourished. No distress. HENT:   Head: Normocephalic and atraumatic. Mouth/Throat: Oropharynx is clear and moist. No oropharyngeal exudate. Eyes: Conjunctivae and EOM are normal. Pupils are equal, round, and reactive to light. Neck: Normal range of motion. No JVD present. No tracheal deviation present. Cardiovascular: Normal rate, regular rhythm, normal heart sounds and intact distal pulses.   Exam reveals no gallop and no friction rub. No murmur heard. Pulmonary/Chest: Effort normal and breath sounds normal. No respiratory distress. She has no wheezes. She has no rales. She exhibits no tenderness. Abdominal: Soft. Bowel sounds are normal. She exhibits no distension and no mass. There is tenderness. There is no rebound and no guarding. RUQ + epigastric TTP   Musculoskeletal: Normal range of motion. She exhibits no edema, tenderness or deformity. Neurological: She is alert and oriented to person, place, and time. No cranial nerve deficit. Coordination normal.   Skin: Skin is warm and dry. No rash noted. No erythema. No pallor. Psychiatric: She has a normal mood and affect. Her behavior is normal.   Nursing note and vitals reviewed. MDM  Number of Diagnoses or Management Options  Acute cystitis without hematuria: new and requires workup  Acute pancreatitis: new and requires workup  Intractable vomiting with nausea, unspecified vomiting type: new and requires workup     Amount and/or Complexity of Data Reviewed  Clinical lab tests: ordered and reviewed  Tests in the radiology section of CPT®: ordered and reviewed  Tests in the medicine section of CPT®: ordered and reviewed  Review and summarize past medical records: yes  Discuss the patient with other providers: yes  Independent visualization of images, tracings, or specimens: yes    Risk of Complications, Morbidity, and/or Mortality  Presenting problems: moderate  Diagnostic procedures: moderate  Management options: moderate    Patient Progress  Patient progress: stable    ED Course   Comment By Time   Patient w/ lipase >1700. CT abd/pelvis w/ IV contrast pending. Cody Mc MD 08/13 6405   CT with evidence of Findings consistent with acute edematous interstitial pancreatitis. No evidence of pancreatitis-related complications. No peripancreatic fluid collection or pseudocyst. Yaakov Ardon MD 08/13 2818   Pt made NPO. Pain well controlled. Will consult Hospitalist for admission.  Lawson Romero MD 08/13 7823       Procedures

## 2017-08-14 NOTE — PROGRESS NOTES
TRANSFER - IN REPORT:    Verbal report received from ER(name) on [facility]  being received from ZAHRA Powell(unit) for routine progression of care      Report consisted of patients Situation, Background, Assessment and   Recommendations(SBAR). Information from the following report(s) SBAR, Kardex, Intake/Output, MAR and Recent Results was reviewed with the receiving nurse. Opportunity for questions and clarification was provided. Assessment completed upon patients arrival to unit and care assumed.

## 2017-08-14 NOTE — H&P
Hospitalist H&P/Consult Note     Admit Date:  2017  7:45 PM   Name:  John Fernandez   Age:  27 y.o.  :  1990   MRN:  [id number]   PCP:  None  Treatment Team: Attending Provider: Sterling Torrez MD    HPI:   Ángel 26 y/o female with hx GERD presents with epigastic abdominal pain and intractable nausea and vomiting since 78 yesterday am. Pasqual Seashore to urgent care and given GI cocktail without relief. Symptoms persisted so she came to ED. Workup reveals lipase of 1793 and CT consistent with pancreatitis. Denies hx pancreatitis or hepatic disease, does not drink alcohol, and gallbladder was unremarkable. She had grossly lipemic serum. She is unaware of any hyperlipidemia as she does not regularly follow for medical care. Has a WBC count of 18.4 with UA suggestive of UTI so was started on Rocephin. Will admit for further workup and treatment. 10 systems reviewed and negative except as noted in HPI. Past Medical History:   Diagnosis Date    Allergic rhinitis due to pollen     Chronic maxillary sinusitis     Kidney stones       Past Surgical History:   Procedure Laterality Date    HX ORTHOPAEDIC      right knee    HX OTHER SURGICAL      bone tumor right great toe      Prior to Admission Medications   Prescriptions Last Dose Informant Patient Reported? Taking? Brompheniramine-Pseudoeph-DM (BROMFED DM) 2-30-10 mg/5 mL syrup Not Taking at Unknown time  Yes No   Sig: Take  by mouth four (4) times daily as needed. 1 tsp   azithromycin (ZITHROMAX Z-SUMMER) 250 mg tablet Not Taking at Unknown time  Yes No   Sig: Take 250 mg by mouth daily. Take two tablets today then one tablet daily   cefUROXime (CEFTIN) 250 mg tablet Not Taking at Unknown time  Yes No   Sig: Take 250 mg by mouth two (2) times a day. cetirizine (ZYRTEC) 10 mg tablet 2017 at Unknown time  Yes Yes   Sig: Take 10 mg by mouth daily.    hyoscyamine SL (LEVSIN/SL) 0.125 mg SL tablet 2017 at Unknown time  Yes Yes   Si.125 mg by SubLINGual route every four (4) hours as needed for Cramping. norgestimate-ethinyl estradiol (ORTHO TRI-CYCLEN, 28,) 0.18/0.215/0.25 mg-35 mcg (28) tab   Yes Yes   Sig: Take 1 Tab by mouth daily. ondansetron hcl (ZOFRAN, AS HYDROCHLORIDE,) 4 mg tablet 8/13/2017 at Unknown time  Yes Yes   Sig: Take 4 mg by mouth every eight (8) hours as needed for Nausea. oxyCODONE IR (ROXICODONE) 5 mg immediate release tablet Not Taking at Unknown time  No No   Sig: Take 1 Tab by mouth every six (6) hours as needed for Pain. predniSONE (DELTASONE) 10 mg tablet Not Taking at Unknown time  Yes No   Sig: Take  by mouth daily (with breakfast). promethazine (PHENERGAN) 25 mg tablet Not Taking at Unknown time  No No   Sig: Take 1 Tab by mouth every six (6) hours as needed for Nausea. raNITIdine (ZANTAC) 150 mg tablet 8/13/2017 at Unknown time  Yes Yes   Sig: Take 150 mg by mouth two (2) times a day. trimethoprim-sulfamethoxazole (BACTRIM DS) 160-800 mg per tablet Not Taking at Unknown time  Yes No   Sig: Take 1 Tab by mouth two (2) times a day. Facility-Administered Medications: None     No Known Allergies   Social History   Substance Use Topics    Smoking status: Never Smoker    Smokeless tobacco: Never Used    Alcohol use Yes      Comment: occ      History reviewed. No pertinent family history.    Immunization History   Administered Date(s) Administered    Influenza Vaccine 11/04/2014       Objective:   Patient Vitals for the past 24 hrs:   Temp Pulse Resp BP SpO2   08/14/17 0138 98.2 °F (36.8 °C) 86 18 97/59 95 %   08/14/17 0022 - 90 - - -   08/13/17 2320 - - - 128/69 100 %   08/13/17 2241 - - - 118/59 100 %   08/13/17 2220 - - - 115/68 96 %   08/13/17 2200 - - - 119/73 98 %   08/13/17 2140 - - - 119/71 96 %   08/13/17 2120 - - - 118/71 98 %   08/13/17 2112 - - - - 100 %   08/13/17 2111 - - - 125/82 -     Oxygen Therapy  O2 Sat (%): 95 % (08/14/17 0138)  Pulse via Oximetry: 83 beats per minute (08/13/17 3614)    Intake/Output Summary (Last 24 hours) at 08/14/17 0218  Last data filed at 08/14/17 0159   Gross per 24 hour   Intake                0 ml   Output              200 ml   Net             -200 ml       Physical Exam:  General:    Well nourished. Alert. Eyes:   Normal sclera. Extraocular movements intact. ENT:  Normocephalic, atraumatic. Moist mucous membranes  CV:   RRR. No murmur, rub, or gallop. Lungs:  CTAB. No wheezing, rhonchi, or rales. Abdomen: Soft, tender in epigastrium  Extremities: Warm and dry. No cyanosis or edema. Neurologic: CN II-XII grossly intact. Sensation intact. Skin:     No rashes or jaundice. No wounds. Psych:  Normal mood and affect. I reviewed the labs, imaging, EKGs, telemetry, and other studies done this admission. Data Review:   Recent Results (from the past 24 hour(s))   CBC WITH AUTOMATED DIFF    Collection Time: 08/13/17  8:05 PM   Result Value Ref Range    WBC 18.4 (H) 4.3 - 11.1 K/uL    RBC 4.72 4.05 - 5.25 M/uL    HGB 13.4 11.7 - 15.4 g/dL    HCT 38.0 35.8 - 46.3 %    MCV 80.5 79.6 - 97.8 FL    MCH 28.4 26.1 - 32.9 PG    MCHC 35.3 (H) 31.4 - 35.0 g/dL    RDW 14.5 11.9 - 14.6 %    PLATELET 040 839 - 331 K/uL    MPV 11.5 10.8 - 14.1 FL    NEUTROPHILS 62 47 - 75 %    BAND NEUTROPHILS 10 (H) 0 - 6 %    LYMPHOCYTES 19 16 - 44 %    MONOCYTES 9 3 - 9 %    ABS. NEUTROPHILS 13.2 (H) 1.7 - 8.2 K/UL    ABS. LYMPHOCYTES 3.5 0.5 - 4.6 K/UL    ABS.  MONOCYTES 1.7 (H) 0.1 - 1.3 K/UL    RBC COMMENTS NORMOCYTIC/NORMOCHROMIC      WBC COMMENTS OCCASIONAL      PLATELET COMMENTS ADEQUATE      DF MANUAL     METABOLIC PANEL, COMPREHENSIVE    Collection Time: 08/13/17  8:05 PM   Result Value Ref Range    Sodium 137 136 - 145 mmol/L    Potassium 3.8 3.5 - 5.1 mmol/L    Chloride 102 98 - 107 mmol/L    CO2 20 (L) 21 - 32 mmol/L    Anion gap 15 7 - 16 mmol/L    Glucose 169 (H) 65 - 100 mg/dL    BUN 6 6 - 23 MG/DL    Creatinine 0.93 0.6 - 1.0 MG/DL    GFR est AA >60 >60 ml/min/1.73m2    GFR est non-AA >60 >60 ml/min/1.73m2    Calcium 9.2 8.3 - 10.4 MG/DL    Bilirubin, total 0.7 0.2 - 1.1 MG/DL    ALT (SGPT) 27 12 - 65 U/L    AST (SGOT) 33 15 - 37 U/L    Alk. phosphatase 64 50 - 136 U/L    Protein, total 8.6 (H) 6.3 - 8.2 g/dL    Albumin 3.2 (L) 3.5 - 5.0 g/dL    Globulin 5.4 (H) 2.3 - 3.5 g/dL    A-G Ratio 0.6 (L) 1.2 - 3.5     URINE MICROSCOPIC    Collection Time: 08/13/17  8:05 PM   Result Value Ref Range    WBC 10-20 0 /hpf    RBC 5-10 0 /hpf    Epithelial cells 3-5 0 /hpf    Bacteria TRACE 0 /hpf    Casts 0 0 /lpf    Crystals, urine 0 0 /LPF    Mucus 0 0 /lpf    Other observations RESULTS VERIFIED MANUALLY     LIPASE    Collection Time: 08/13/17  8:05 PM   Result Value Ref Range    Lipase 1793 (H) 73 - 393 U/L   HCG URINE, QL. - POC    Collection Time: 08/13/17  8:16 PM   Result Value Ref Range    Pregnancy test,urine (POC) NEGATIVE  NEG     MRI/CT POC CREATININE    Collection Time: 08/13/17 10:20 PM   Result Value Ref Range    Creatinine (POC) 0.6 0.6 - 1.0 MG/DL    GFRAA, POC >60 >60 ml/min/1.73m2    GFRNA, POC >60 >60 ml/min/1.73m2       Imaging Studies:  CXR Results  (Last 48 hours)    None        CT Results  (Last 48 hours)               08/13/17 2303  CT ABD PELV W CONT Final result    Impression:  IMPRESSION:       Findings consistent with acute edematous interstitial pancreatitis. No evidence   of pancreatitis-related complications. No peripancreatic fluid collection or   pseudocyst.                           Narrative:  CT abdomen and pelvis with contrast        COMPARISON: June 22, 2014. INDICATION: Right upper quadrant and epigastric pain. Lipase 1793. TECHNIQUE: CT imaging was performed of the abdomen and pelvis following the   uncomplicated administration of intravenous contrast (Isovue 370, 100 mL). Oral   contrast was administered.  Radiation dose reduction techniques were used for   this study:  Our CT scanners use one or all of the following: Automated exposure   control, adjustment of the mA and/or kVp according to patient's size, iterative   reconstruction. FINDINGS:       Lung bases are unremarkable. Abdomen findings: There is moderate peripancreatic inflammatory stranding and ill-defined fluid,   consistent with pancreatitis. No evidence of pancreatic necrosis. No focal   peripancreatic fluid collection or pseudocyst. Splenic vein appears patent. Gallbladder is unremarkable. Liver is enlarged measuring up to 20 cm in the   craniocaudal dimension. No focal liver lesion. The spleen, adrenal glands, and   the right kidney are unremarkable. There is suggestion of punctate   nonobstructing left renal calculi. No hydronephrosis. Abdominal aorta is normal in course and caliber. Stomach is normal in contour. Small bowel loops are of normal caliber. No small bowel obstruction. No evidence   of lymphadenopathy. Pelvic findings: The colon, appendix, and the urinary bladder are unremarkable. The uterus is not   well evaluated. There is no free air or free fluid. Surrounding bones are intact. Assessment and Plan:     Hospital Problems as of 8/14/2017  Never Reviewed          Codes Class Noted - Resolved POA    * (Principal)Acute pancreatitis ICD-10-CM: K85.90  ICD-9-CM: 714.5  8/14/2017 - Present Yes        Nausea and vomiting ICD-10-CM: R11.2  ICD-9-CM: 787.01  8/14/2017 - Present Yes        Leukocytosis ICD-10-CM: E32.152  ICD-9-CM: 288.60  8/14/2017 - Present Yes        UTI (urinary tract infection) ICD-10-CM: N39.0  ICD-9-CM: 599.0  8/14/2017 - Present Yes        GERD (gastroesophageal reflux disease) ICD-10-CM: K21.9  ICD-9-CM: 530.81  8/14/2017 - Present Yes              PLAN:  · Admit inpatient  · Patient had grossly lipemic blood/serum when drawn by lab. Most likely her pancreatitis attributable to hypertriglyceridemia. She does not consume alcohol and CT does not show GB disease.   · Check lipid status. · Supportive care for now. NPO, IVF, pain management and antiemetics  · BID IV PPI  · Continue rocephin for UTI. Check urine culture  · SCDs for DVT prophylaxis. Estimated LOS: 3-4 midnights    Addendum: patient's lipid panel resulted and triglycerides 3595. Her mother has hx pancreatitis as well. 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 Will start gemfibrozil 600 mg po BID. Consult GI in am. She has elevated CRP of 7.0. Calcium is normal at present. Has no hx DM but glucose elevated, check A1c. Stop oral hormones. Check thyroid levels and lactic acid. Start insulin sliding scale. Consider apheresis.      Signed:  Maximus Rebollar MD

## 2017-08-14 NOTE — ED NOTES
TRANSFER - OUT REPORT:    Verbal report given to Jose Eller RN (name) on Ash Friends  being transferred to [facility] (unit) for routine progression of care       Report consisted of patients Situation, Background, Assessment and   Recommendations(SBAR). Information from the following report(s) SBAR was reviewed with the receiving nurse. Lines:   Peripheral IV 08/13/17 Left Antecubital (Active)   Site Assessment Clean, dry, & intact 8/13/2017  9:12 PM   Phlebitis Assessment 0 8/13/2017  9:12 PM   Infiltration Assessment 0 8/13/2017  9:12 PM   Dressing Status Clean, dry, & intact 8/13/2017  9:12 PM   Alcohol Cap Used No 8/13/2017  9:12 PM        Opportunity for questions and clarification was provided.       Patient transported with:   Registered Nurse

## 2017-08-14 NOTE — PROGRESS NOTES
Pt assessment completed. Pt in bed with family at bedside. NAD noted. SCDs placed. No nausea at this time. Pt states pain is at about a 4 and she doesn't need anything for it right now. Pt requesting to take some medication for constipation \"later, in the morning\". Call light and essentials within reach. Pt aware to call with needs. Will monitor. Dual skin assessment completed with this RN and Mariano Clements RN. Pt skin dry and intact. Pt does have a flat mole on inner R thigh, and scattered moles on back. And a few scattered skin colored bumps on skin.

## 2017-08-14 NOTE — PROGRESS NOTES
Problem: Interdisciplinary Rounds  Goal: Interdisciplinary Rounds  Interdisciplinary team rounds were held 8/14/2017 with the following team members:Care Management, Nursing, Nutrition and Physician and the patient. Plan of care discussed. See clinical pathway and/or care plan for interventions and desired outcomes.

## 2017-08-14 NOTE — PROGRESS NOTES
Pt is resting comfortably. Mother is at the bedside. Pt is alert, oriented, and ambulatory. Pt has D5/20KCl infusing at 50 mL/hr and Insulin drip infusing at 2. Pt has 2 IV lines that are CDI in the left [facility] and hand. Respirations are even and unlabored. RRR. No needs voiced at this time. No SOB or pain is reported. Call light is within reach. Pt aware to call for any assistance. Will continue to monitor.

## 2017-08-14 NOTE — CONSULTS
[facility] Consult Note       Primary GI Physician: None    Referring Physician:  Dr Governor Martínez Date:  8/14/2017    Admit Date:  8/13/2017    Chief Complaint:  Pancreatitis     Subjective:     History of Present Illness:  Patient is a 27 y.o. female with PMH of GERD and kidney stones, who is seen in consultation at the request of Dr. Jaiden Jay for pancreatitis . She reports sudden onset of generalized mid and lower abd pain which began yesterday accompanied by N/V. Initial lipase was 1793 with normal LFTs. CT with enlarged liver and findings consistent with acute pancreatitis. Gallbladder appeared normal.  She denies ETOH use or new medication. Triglycerides are elevated at 3,595 with total cholesterol of 482. Her mother has high triglycerides and has had several bouts of pancreatitis. She reports GERD for which she takes ranitidine daily. She denies significant change in bowel habits but reports occasional constipation. No known history of GI malignancy. Her father had lung cancer and uncle had throat cancer. Has elevated WBC UA suggestive of UTI and was started on Rocephin. CT ABD/P 8/13/17  Lung bases are unremarkable. Abdomen findings: There is moderate peripancreatic inflammatory stranding and ill-defined fluid,  consistent with pancreatitis. No evidence of pancreatic necrosis. No focal  peripancreatic fluid collection or pseudocyst. Splenic vein appears patent. Gallbladder is unremarkable. Liver is enlarged measuring up to 20 cm in the  craniocaudal dimension. No focal liver lesion. The spleen, adrenal glands, and  the right kidney are unremarkable. There is suggestion of punctate  nonobstructing left renal calculi. No hydronephrosis. Abdominal aorta is normal in course and caliber. Stomach is normal in contour. Small bowel loops are of normal caliber. No small bowel obstruction. No evidence  of lymphadenopathy. Pelvic findings:   The colon, appendix, and the urinary bladder are unremarkable. The uterus is not  well evaluated. There is no free air or free fluid. Surrounding bones are intact. IMPRESSION:  Findings consistent with acute edematous interstitial pancreatitis. No evidence  of pancreatitis-related complications. No peripancreatic fluid collection or  pseudocyst.       PMH:  Past Medical History:   Diagnosis Date    Allergic rhinitis due to pollen     Chronic maxillary sinusitis     Kidney stones        PSH:  Past Surgical History:   Procedure Laterality Date    HX ORTHOPAEDIC      right knee    HX OTHER SURGICAL      bone tumor right great toe       Allergies:  No Known Allergies    Home Medications:  Prior to Admission medications    Medication Sig Start Date End Date Taking? Authorizing Provider   cetirizine (ZYRTEC) 10 mg tablet Take 10 mg by mouth daily. Yes Historical Provider   norgestimate-ethinyl estradiol (ORTHO TRI-CYCLEN, 28,) 0.18/0.215/0.25 mg-35 mcg (28) tab Take 1 Tab by mouth daily. Yes Historical Provider   ondansetron hcl (ZOFRAN, AS HYDROCHLORIDE,) 4 mg tablet Take 4 mg by mouth every eight (8) hours as needed for Nausea. Yes Phys Other, MD   hyoscyamine SL (LEVSIN/SL) 0.125 mg SL tablet 0.125 mg by SubLINGual route every four (4) hours as needed for Cramping. Yes Phys Other, MD   raNITIdine (ZANTAC) 150 mg tablet Take 150 mg by mouth two (2) times a day. Yes Phys Other, MD   Brompheniramine-Pseudoeph-DM (BROMFED DM) 2-30-10 mg/5 mL syrup Take  by mouth four (4) times daily as needed. 1 tsp    Historical Provider   azithromycin (ZITHROMAX Z-SUMMER) 250 mg tablet Take 250 mg by mouth daily. Take two tablets today then one tablet daily    Historical Provider   predniSONE (DELTASONE) 10 mg tablet Take  by mouth daily (with breakfast). Historical Provider   cefUROXime (CEFTIN) 250 mg tablet Take 250 mg by mouth two (2) times a day.     Historical Provider   trimethoprim-sulfamethoxazole (BACTRIM DS) 160-800 mg per tablet Take 1 Tab by mouth two (2) times a day. Historical Provider   oxyCODONE IR (ROXICODONE) 5 mg immediate release tablet Take 1 Tab by mouth every six (6) hours as needed for Pain. 6/22/14   Katlyn Parry MD   promethazine (PHENERGAN) 25 mg tablet Take 1 Tab by mouth every six (6) hours as needed for Nausea.  6/22/14   Katlyn Parry MD       Hospital Medications:  Current Facility-Administered Medications   Medication Dose Route Frequency    sodium chloride (NS) flush 5-10 mL  5-10 mL IntraVENous Q8H    sodium chloride (NS) flush 5-10 mL  5-10 mL IntraVENous PRN    0.9% sodium chloride infusion  150 mL/hr IntraVENous CONTINUOUS    acetaminophen (TYLENOL) tablet 650 mg  650 mg Oral Q4H PRN    oxyCODONE-acetaminophen (PERCOCET) 5-325 mg per tablet 1 Tab  1 Tab Oral Q4H PRN    HYDROmorphone (PF) (DILAUDID) injection 0.5 mg  0.5 mg IntraVENous Q4H PRN    naloxone (NARCAN) injection 0.4 mg  0.4 mg IntraVENous PRN    ondansetron (ZOFRAN) injection 4 mg  4 mg IntraVENous Q4H PRN    [START ON 8/15/2017] cefTRIAXone (ROCEPHIN) 1 g in 0.9% sodium chloride (MBP/ADV) 50 mL  1 g IntraVENous Q24H    famotidine (PF) (PEPCID) 20 mg in sodium chloride 0.9 % 10 mL injection  20 mg IntraVENous Q12H    ondansetron (ZOFRAN) 4 mg/2 mL injection        docusate sodium (COLACE) capsule 100 mg  100 mg Oral DAILY    bisacodyl (DULCOLAX) tablet 5 mg  5 mg Oral DAILY PRN    Or    bisacodyl (DULCOLAX) suppository 10 mg  10 mg Rectal DAILY PRN    Or    magnesium hydroxide (MILK OF MAGNESIA) 400 mg/5 mL oral suspension 30 mL  30 mL Oral DAILY PRN    promethazine (PHENERGAN) with saline injection 12.5 mg  12.5 mg IntraVENous Q6H PRN    gemfibrozil (LOPID) tablet 600 mg  600 mg Oral ACB&D    insulin lispro (HUMALOG) injection   SubCUTAneous Q6H    dextrose 40% (GLUTOSE) oral gel 1 Tube  15 g Oral PRN    glucagon (GLUCAGEN) injection 1 mg  1 mg IntraMUSCular PRN    dextrose (D50W) injection syrg 12.5-25 g  25-50 mL IntraVENous PRN    insulin regular (NOVOLIN R, HUMULIN R) 100 Units in 0.9% sodium chloride 100 mL infusion  1-10 Units/hr IntraVENous CONTINUOUS    dextrose 5% - 0.9% NaCl with KCl 20 mEq/L infusion  50 mL/hr IntraVENous CONTINUOUS       Social History:  Social History   Substance Use Topics    Smoking status: Never Smoker    Smokeless tobacco: Never Used    Alcohol use Yes      Comment: occ       Pt denies any history of drug use, blood transfusions, or tattoos. Family History:  History reviewed. No pertinent family history. Review of Systems:  A detailed 10 system ROS is obtained, with pertinent positives as listed above. All others are negative. Diet:  NPO    Objective:     Physical Exam:  Vitals:  Visit Vitals    /66 (BP 1 Location: Right arm, BP Patient Position: At rest;Head of bed elevated (Comment degrees))    Pulse 100    Temp 97 °F (36.1 °C)    Resp 20    LMP 07/10/2017    SpO2 96%    Breastfeeding No     Gen:  Pt is alert, cooperative, no acute distress  Skin:  Extremities and face reveal no rashes. HEENT: Sclerae anicteric. Extra-occular muscles are intact. No oral ulcers. No abnormal pigmentation of the lips. The neck is supple. Cardiovascular: Regular rate and rhythm. No murmurs, gallops, or rubs. Respiratory:  Comfortable breathing with no accessory muscle use. Clear breath sounds anteriorly with no wheezes, rales, or rhonchi. GI:  Abdomen nondistended, soft. Mod diffusely ttp over entire abd w/o R/G. Normal active bowel sounds. No enlargement of the liver or spleen. No masses palpable. Rectal:  Deferred  Musculoskeletal:  No pitting edema of the lower legs. Neurological:  Gross memory appears intact. Patient is alert and oriented. Psychiatric:  Mood appears appropriate with judgement intact.     Laboratory:    Recent Labs      08/14/17   0145  08/13/17 2005   WBC   --   18.4*   HGB   --   13.4   HCT   --   38.0   PLT   --   174   MCV   --   80.5   NA   --   137   K --   3.8   CL   --   102   CO2   --   20*   BUN   --   6   CREA   --   0.93   CA   --   9.2   MG  1.4*   --    GLU   --   169*   AP   --   64   SGOT   --   33   ALT   --   27   TBILI   --   0.7   ALB   --   3.2*   TP   --   8.6*   LPSE   --   1793*      Results for Nic Black (MRN [id number]) as of 8/14/2017 09:52   Ref. Range 8/14/2017 01:45   Phosphorus Latest Ref Range: 2.5 - 4.5 MG/DL 2.8   Magnesium Latest Ref Range: 1.8 - 2.4 mg/dL 1.4 (LL)   LD Latest Ref Range: 100 - 190 U/L 169   Lactic acid Latest Ref Range: 0.4 - 2.0 MMOL/L 1.0   Triglyceride Latest Ref Range: 35 - 150 MG/DL 3595 (H)   Cholesterol, total Latest Ref Range: <200 MG/ (H)   HDL Cholesterol Latest Ref Range: 40 - 60 MG/DL 37 (L)   CHOL/HDL Ratio Latest Units:   13.0   LDL, calculated Latest Ref Range: <100 MG/DL LDL AND VLDL CHOL. .. VLDL, calculated Latest Ref Range: 6.0 - 23.0 MG/ (H)   LDL,Direct Latest Ref Range: <100 mg/dl 66   Hemoglobin A1c, (calculated) Latest Ref Range: 4.8 - 6.0 % 6.3 (H)   Est. average glucose Latest Units: mg/dL 134   C-Reactive protein Latest Ref Range: 0.0 - 0.9 mg/dL 7.0 (H)       Assessment:     Principal Problem:    Acute pancreatitis (8/14/2017)    Active Problems:    Leukocytosis (8/14/2017)      Nausea and vomiting (8/14/2017)      UTI (urinary tract infection) (8/14/2017)      GERD (gastroesophageal reflux disease) (8/14/2017)      27 y.o. female with PMH of GERD and kidney stones admitted with sudden onset of generalized mid and lower abd pain which began yesterday accompanied by N/V. Initial lipase [id number] with normal LFTs. CT with enlarged liver and findings consistent with acute pancreatitis. Gallbladder appeared normal.  She denies ETOH use or new medication. Triglycerides are elevated at 3,595 with total cholesterol of 482. Her mother has high triglycerides and has had several bouts of pancreatitis. She reports GERD for which she takes ranitidine daily.   She denies significant change in bowel habits but reports occasional constipation. No known history of GI malignancy. Has elevated WBC UA suggestive of UTI and was started on Rocephin. Plan:     1) NPO  2) Continue IVF, will increase to 200 cc/hr  3) Pain and nausea control  4) Consider Gemfibrozil when able to take PO  5) Stop Pepcid and start Protonix    Miquel Vázquez NP  Patient is seen and examined in collaboration with Dr. Tyler Eubanks.   Assessment and plan as per Dr. Nettie Guerra

## 2017-08-15 LAB
ALBUMIN SERPL BCP-MCNC: 2.2 G/DL (ref 3.5–5)
ALBUMIN/GLOB SERPL: 0.5 {RATIO} (ref 1.2–3.5)
ALP SERPL-CCNC: 61 U/L (ref 50–136)
ALT SERPL-CCNC: 16 U/L (ref 12–65)
ANION GAP BLD CALC-SCNC: 12 MMOL/L (ref 7–16)
AST SERPL W P-5'-P-CCNC: 12 U/L (ref 15–37)
BASOPHILS # BLD AUTO: 0 K/UL (ref 0–0.2)
BASOPHILS # BLD: 0 % (ref 0–2)
BILIRUB SERPL-MCNC: 1 MG/DL (ref 0.2–1.1)
BUN SERPL-MCNC: 4 MG/DL (ref 6–23)
CALCIUM SERPL-MCNC: 7 MG/DL (ref 8.3–10.4)
CHLORIDE SERPL-SCNC: 105 MMOL/L (ref 98–107)
CO2 SERPL-SCNC: 20 MMOL/L (ref 21–32)
CREAT SERPL-MCNC: 0.73 MG/DL (ref 0.6–1)
DIFFERENTIAL METHOD BLD: ABNORMAL
EOSINOPHIL # BLD: 0 K/UL (ref 0–0.8)
EOSINOPHIL NFR BLD: 0 % (ref 0.5–7.8)
ERYTHROCYTE [DISTWIDTH] IN BLOOD BY AUTOMATED COUNT: 15.5 % (ref 11.9–14.6)
GLOBULIN SER CALC-MCNC: 4.7 G/DL (ref 2.3–3.5)
GLUCOSE BLD STRIP.AUTO-MCNC: 116 MG/DL (ref 65–100)
GLUCOSE BLD STRIP.AUTO-MCNC: 123 MG/DL (ref 65–100)
GLUCOSE BLD STRIP.AUTO-MCNC: 131 MG/DL (ref 65–100)
GLUCOSE BLD STRIP.AUTO-MCNC: 131 MG/DL (ref 65–100)
GLUCOSE BLD STRIP.AUTO-MCNC: 144 MG/DL (ref 65–100)
GLUCOSE SERPL-MCNC: 146 MG/DL (ref 65–100)
HCT VFR BLD AUTO: 36.4 % (ref 35.8–46.3)
HGB BLD-MCNC: 11.8 G/DL (ref 11.7–15.4)
IMM GRANULOCYTES # BLD: 0.1 K/UL (ref 0–0.5)
IMM GRANULOCYTES NFR BLD AUTO: 0.6 % (ref 0–5)
LACTATE SERPL-SCNC: 1.2 MMOL/L (ref 0.4–2)
LIPASE SERPL-CCNC: 550 U/L (ref 73–393)
LYMPHOCYTES # BLD AUTO: 6 % (ref 13–44)
LYMPHOCYTES # BLD: 1.6 K/UL (ref 0.5–4.6)
MCH RBC QN AUTO: 26.9 PG (ref 26.1–32.9)
MCHC RBC AUTO-ENTMCNC: 32.4 G/DL (ref 31.4–35)
MCV RBC AUTO: 82.9 FL (ref 79.6–97.8)
MONOCYTES # BLD: 1.4 K/UL (ref 0.1–1.3)
MONOCYTES NFR BLD AUTO: 6 % (ref 4–12)
NEUTS SEG # BLD: 22.3 K/UL (ref 1.7–8.2)
NEUTS SEG NFR BLD AUTO: 87 % (ref 43–78)
PLATELET # BLD AUTO: 168 K/UL (ref 150–450)
PMV BLD AUTO: 11.6 FL (ref 10.8–14.1)
POTASSIUM SERPL-SCNC: 4.3 MMOL/L (ref 3.5–5.1)
PROCALCITONIN SERPL-MCNC: 0.3 NG/ML
PROT SERPL-MCNC: 6.9 G/DL (ref 6.3–8.2)
RBC # BLD AUTO: 4.39 M/UL (ref 4.05–5.25)
SODIUM SERPL-SCNC: 137 MMOL/L (ref 136–145)
TRIGL SERPL-MCNC: 1112 MG/DL (ref 35–150)
WBC # BLD AUTO: 25.5 K/UL (ref 4.3–11.1)

## 2017-08-15 PROCEDURE — C9113 INJ PANTOPRAZOLE SODIUM, VIA: HCPCS | Performed by: NURSE PRACTITIONER

## 2017-08-15 PROCEDURE — 65270000029 HC RM PRIVATE

## 2017-08-15 PROCEDURE — 84145 PROCALCITONIN (PCT): CPT | Performed by: INTERNAL MEDICINE

## 2017-08-15 PROCEDURE — 85025 COMPLETE CBC W/AUTO DIFF WBC: CPT | Performed by: HOSPITALIST

## 2017-08-15 PROCEDURE — 36415 COLL VENOUS BLD VENIPUNCTURE: CPT | Performed by: HOSPITALIST

## 2017-08-15 PROCEDURE — 74011000258 HC RX REV CODE- 258: Performed by: HOSPITALIST

## 2017-08-15 PROCEDURE — 74011636637 HC RX REV CODE- 636/637: Performed by: HOSPITALIST

## 2017-08-15 PROCEDURE — 74011000250 HC RX REV CODE- 250: Performed by: HOSPITALIST

## 2017-08-15 PROCEDURE — 74011250636 HC RX REV CODE- 250/636: Performed by: HOSPITALIST

## 2017-08-15 PROCEDURE — 83605 ASSAY OF LACTIC ACID: CPT | Performed by: INTERNAL MEDICINE

## 2017-08-15 PROCEDURE — 74011250637 HC RX REV CODE- 250/637: Performed by: HOSPITALIST

## 2017-08-15 PROCEDURE — 77030034849

## 2017-08-15 PROCEDURE — 83690 ASSAY OF LIPASE: CPT | Performed by: HOSPITALIST

## 2017-08-15 PROCEDURE — 74011000250 HC RX REV CODE- 250: Performed by: NURSE PRACTITIONER

## 2017-08-15 PROCEDURE — 82962 GLUCOSE BLOOD TEST: CPT

## 2017-08-15 PROCEDURE — 84478 ASSAY OF TRIGLYCERIDES: CPT | Performed by: HOSPITALIST

## 2017-08-15 PROCEDURE — 74011250636 HC RX REV CODE- 250/636: Performed by: NURSE PRACTITIONER

## 2017-08-15 PROCEDURE — 80053 COMPREHEN METABOLIC PANEL: CPT | Performed by: HOSPITALIST

## 2017-08-15 RX ORDER — FACIAL-BODY WIPES
10 EACH TOPICAL DAILY PRN
Status: DISCONTINUED | OUTPATIENT
Start: 2017-08-15 | End: 2017-08-22 | Stop reason: HOSPADM

## 2017-08-15 RX ORDER — HYDROMORPHONE HYDROCHLORIDE 1 MG/ML
2 INJECTION, SOLUTION INTRAMUSCULAR; INTRAVENOUS; SUBCUTANEOUS
Status: DISCONTINUED | OUTPATIENT
Start: 2017-08-15 | End: 2017-08-22 | Stop reason: HOSPADM

## 2017-08-15 RX ORDER — HYDROMORPHONE HYDROCHLORIDE 1 MG/ML
1 INJECTION, SOLUTION INTRAMUSCULAR; INTRAVENOUS; SUBCUTANEOUS
Status: DISCONTINUED | OUTPATIENT
Start: 2017-08-15 | End: 2017-08-22 | Stop reason: HOSPADM

## 2017-08-15 RX ADMIN — GEMFIBROZIL 600 MG: 600 TABLET ORAL at 17:55

## 2017-08-15 RX ADMIN — OXYCODONE HYDROCHLORIDE AND ACETAMINOPHEN 1 TABLET: 5; 325 TABLET ORAL at 02:58

## 2017-08-15 RX ADMIN — HYDROMORPHONE HYDROCHLORIDE 1 MG: 1 INJECTION, SOLUTION INTRAMUSCULAR; INTRAVENOUS; SUBCUTANEOUS at 21:47

## 2017-08-15 RX ADMIN — DOCUSATE SODIUM 100 MG: 100 CAPSULE, LIQUID FILLED ORAL at 08:43

## 2017-08-15 RX ADMIN — SODIUM CHLORIDE 2 UNITS/HR: 900 INJECTION, SOLUTION INTRAVENOUS at 21:45

## 2017-08-15 RX ADMIN — HYDROMORPHONE HYDROCHLORIDE 1 MG: 1 INJECTION, SOLUTION INTRAMUSCULAR; INTRAVENOUS; SUBCUTANEOUS at 17:55

## 2017-08-15 RX ADMIN — DEXTROSE MONOHYDRATE, SODIUM CHLORIDE, AND POTASSIUM CHLORIDE 50 ML/HR: 50; 9; 1.49 INJECTION, SOLUTION INTRAVENOUS at 06:05

## 2017-08-15 RX ADMIN — GEMFIBROZIL 600 MG: 600 TABLET ORAL at 07:57

## 2017-08-15 RX ADMIN — SODIUM CHLORIDE 40 MG: 9 INJECTION INTRAMUSCULAR; INTRAVENOUS; SUBCUTANEOUS at 14:06

## 2017-08-15 RX ADMIN — ONDANSETRON 4 MG: 2 INJECTION INTRAMUSCULAR; INTRAVENOUS at 17:55

## 2017-08-15 RX ADMIN — HYDROMORPHONE HYDROCHLORIDE 1 MG: 1 INJECTION, SOLUTION INTRAMUSCULAR; INTRAVENOUS; SUBCUTANEOUS at 12:19

## 2017-08-15 RX ADMIN — ONDANSETRON 4 MG: 2 INJECTION INTRAMUSCULAR; INTRAVENOUS at 01:40

## 2017-08-15 RX ADMIN — OXYCODONE HYDROCHLORIDE AND ACETAMINOPHEN 1 TABLET: 5; 325 TABLET ORAL at 08:43

## 2017-08-15 RX ADMIN — HYDROMORPHONE HYDROCHLORIDE 1 MG: 1 INJECTION, SOLUTION INTRAMUSCULAR; INTRAVENOUS; SUBCUTANEOUS at 15:00

## 2017-08-15 RX ADMIN — FAMOTIDINE 20 MG: 10 INJECTION, SOLUTION INTRAVENOUS at 09:47

## 2017-08-15 RX ADMIN — FAMOTIDINE 20 MG: 10 INJECTION, SOLUTION INTRAVENOUS at 20:21

## 2017-08-15 RX ADMIN — Medication 5 ML: at 21:46

## 2017-08-15 RX ADMIN — OXYCODONE HYDROCHLORIDE AND ACETAMINOPHEN 1 TABLET: 5; 325 TABLET ORAL at 20:21

## 2017-08-15 RX ADMIN — SODIUM CHLORIDE 12.5 MG: 9 INJECTION INTRAMUSCULAR; INTRAVENOUS; SUBCUTANEOUS at 09:48

## 2017-08-15 RX ADMIN — Medication 5 ML: at 05:00

## 2017-08-15 RX ADMIN — HYDROMORPHONE HYDROCHLORIDE 1 MG: 1 INJECTION, SOLUTION INTRAMUSCULAR; INTRAVENOUS; SUBCUTANEOUS at 06:05

## 2017-08-15 NOTE — PROGRESS NOTES
Percocet (5mg/oral) given for pain. Pain is at level of 6 out of 10. Mother is at the bedside. Will continue to monitor.

## 2017-08-15 NOTE — PROGRESS NOTES
HOB elevated, no acute distress, upper and lower quadrant pain abdomin, pain level 5, no nausea or vomiting, no complaints of calf pain.

## 2017-08-15 NOTE — PROGRESS NOTES
Hospitalist Progress Note    8/15/2017  Admit Date: 2017  7:45 PM   NAME: Ravinder Antunez   :  1990   DOS:              08/15/17  MRN:  [id number]   Attending: Estrellita Jimenez MD  PCP:  None  Treatment Team: Attending Provider: Farshad Drummond MD; Consulting Provider: Ana Cobb MD; Utilization Review: Bree Merino RN    Full Code     SUBJECTIVE:   As previously documented:       08/15/17    Ravinder Antunez 26 yo female admitted for acute pancreatitis secondary to hypertriglyceridemia. Initially > 3000 now around 1000. Today pt is tachycardic and complaining of severs abdominal pain and decreased urine ouput. Seen by GI who have increased her pain meds, ivfls and are getting a repeat ct    Plasmapharesis was previously discussed with her- she was not agreeable. She now is if it is needed. 10+ ROS reviewed and negative except for positive in HPI.    No Known Allergies  Current Facility-Administered Medications   Medication Dose Route Frequency    bisacodyl (DULCOLAX) suppository 10 mg  10 mg Rectal DAILY PRN    pantoprazole (PROTONIX) 40 mg in sodium chloride 0.9 % 10 mL injection  40 mg IntraVENous DAILY    HYDROmorphone (PF) (DILAUDID) injection 2 mg  2 mg IntraVENous Q3H PRN    HYDROmorphone (PF) (DILAUDID) injection 1 mg  1 mg IntraVENous Q3H PRN    sodium chloride (NS) flush 5-10 mL  5-10 mL IntraVENous Q8H    sodium chloride (NS) flush 5-10 mL  5-10 mL IntraVENous PRN    0.9% sodium chloride infusion  250 mL/hr IntraVENous CONTINUOUS    acetaminophen (TYLENOL) tablet 650 mg  650 mg Oral Q4H PRN    oxyCODONE-acetaminophen (PERCOCET) 5-325 mg per tablet 1 Tab  1 Tab Oral Q4H PRN    naloxone (NARCAN) injection 0.4 mg  0.4 mg IntraVENous PRN    ondansetron (ZOFRAN) injection 4 mg  4 mg IntraVENous Q4H PRN    cefTRIAXone (ROCEPHIN) 1 g in 0.9% sodium chloride (MBP/ADV) 50 mL  1 g IntraVENous Q24H    famotidine (PF) (PEPCID) 20 mg in sodium chloride 0.9 % 10 mL injection  20 mg IntraVENous Q12H    docusate sodium (COLACE) capsule 100 mg  100 mg Oral DAILY    bisacodyl (DULCOLAX) tablet 5 mg  5 mg Oral DAILY PRN    Or    bisacodyl (DULCOLAX) suppository 10 mg  10 mg Rectal DAILY PRN    Or    magnesium hydroxide (MILK OF MAGNESIA) 400 mg/5 mL oral suspension 30 mL  30 mL Oral DAILY PRN    promethazine (PHENERGAN) with saline injection 12.5 mg  12.5 mg IntraVENous Q6H PRN    gemfibrozil (LOPID) tablet 600 mg  600 mg Oral ACB&D    insulin lispro (HUMALOG) injection   SubCUTAneous Q6H    dextrose 40% (GLUTOSE) oral gel 1 Tube  15 g Oral PRN    glucagon (GLUCAGEN) injection 1 mg  1 mg IntraMUSCular PRN    dextrose (D50W) injection syrg 12.5-25 g  25-50 mL IntraVENous PRN    insulin regular (NOVOLIN R, HUMULIN R) 100 Units in 0.9% sodium chloride 100 mL infusion  1-10 Units/hr IntraVENous CONTINUOUS    dextrose 5% - 0.9% NaCl with KCl 20 mEq/L infusion  50 mL/hr IntraVENous CONTINUOUS         Immunization History   Administered Date(s) Administered    Influenza Vaccine 2014     Objective:     Patient Vitals for the past 24 hrs:   Temp Pulse Resp BP SpO2   08/15/17 1155 98.2 °F (36.8 °C) (!) 126 20 136/75 96 %   08/15/17 0825 98.9 °F (37.2 °C) (!) 126 20 124/69 -   08/15/17 0613 98.4 °F (36.9 °C) 100 20 124/70 -   08/15/17 0255 97.6 °F (36.4 °C) (!) 130 22 127/69 95 %   17 2215 99.9 °F (37.7 °C) (!) 118 20 116/61 96 %   17 1816 99.8 °F (37.7 °C) (!) 110 20 115/61 99 %     Temp (24hrs), Av.8 °F (37.1 °C), Min:97.6 °F (36.4 °C), Max:99.9 °F (37.7 °C)    Oxygen Therapy  O2 Sat (%): 96 % (08/15/17 1155)  Pulse via Oximetry: 83 beats per minute (17 2320)  Oxygen Therapy  O2 Sat (%): 96 % (08/15/17 1155)  Pulse via Oximetry: 83 beats per minute (17)    Physical Exam:  General:         Alert, cooperative, no distress   HEENT:               NCAT. No obvious deformity. Nares normal. No drainage  Lungs:  CTABL.    No wheezing/rhonchi/rales  Cardiovascular:   RRR. No m/r/g. No pedal edema b/l. +2 PT/DT pulses b/l. Abdomen:       S/nt/ mild tenderness hypotonic   Bowel sounds normal. .   Skin:         No rashes or lesions. Not Jaundiced  Neurologic:    AAOx3. CN II- XII grossly WNL. No gross focal deficit. Moves all extremities. Normal sensory. Normal gait. Psychiatric:         Good mood. Normal affect. Denies any SI/HI. DIAGNOSTIC STUDIES      Data Review:   Recent Results (from the past 24 hour(s))   TRIGLYCERIDE    Collection Time: 08/14/17  3:20 PM   Result Value Ref Range    Triglyceride 2520 (H) 35 - 150 MG/DL   GLUCOSE, POC    Collection Time: 08/14/17  5:38 PM   Result Value Ref Range    Glucose (POC) 128 (H) 65 - 100 mg/dL   GLUCOSE, POC    Collection Time: 08/14/17  8:35 PM   Result Value Ref Range    Glucose (POC) 126 (H) 65 - 100 mg/dL   GLUCOSE, POC    Collection Time: 08/14/17 11:43 PM   Result Value Ref Range    Glucose (POC) 136 (H) 65 - 100 mg/dL   GLUCOSE, POC    Collection Time: 08/15/17  2:57 AM   Result Value Ref Range    Glucose (POC) 131 (H) 65 - 290 mg/dL   METABOLIC PANEL, COMPREHENSIVE    Collection Time: 08/15/17  6:55 AM   Result Value Ref Range    Sodium 137 136 - 145 mmol/L    Potassium 4.3 3.5 - 5.1 mmol/L    Chloride 105 98 - 107 mmol/L    CO2 20 (L) 21 - 32 mmol/L    Anion gap 12 7 - 16 mmol/L    Glucose 146 (H) 65 - 100 mg/dL    BUN 4 (L) 6 - 23 MG/DL    Creatinine 0.73 0.6 - 1.0 MG/DL    GFR est AA >60 >60 ml/min/1.73m2    GFR est non-AA >60 >60 ml/min/1.73m2    Calcium 7.0 (L) 8.3 - 10.4 MG/DL    Bilirubin, total 1.0 0.2 - 1.1 MG/DL    ALT (SGPT) 16 12 - 65 U/L    AST (SGOT) 12 (L) 15 - 37 U/L    Alk.  phosphatase 61 50 - 136 U/L    Protein, total 6.9 6.3 - 8.2 g/dL    Albumin 2.2 (L) 3.5 - 5.0 g/dL    Globulin 4.7 (H) 2.3 - 3.5 g/dL    A-G Ratio 0.5 (L) 1.2 - 3.5     LIPASE    Collection Time: 08/15/17  6:55 AM   Result Value Ref Range    Lipase 550 (H) 73 - 393 U/L   CBC WITH AUTOMATED DIFF    Collection Time: 08/15/17  6:55 AM   Result Value Ref Range    WBC 25.5 (H) 4.3 - 11.1 K/uL    RBC 4.39 4.05 - 5.25 M/uL    HGB 11.8 11.7 - 15.4 g/dL    HCT 36.4 35.8 - 46.3 %    MCV 82.9 79.6 - 97.8 FL    MCH 26.9 26.1 - 32.9 PG    MCHC 32.4 31.4 - 35.0 g/dL    RDW 15.5 (H) 11.9 - 14.6 %    PLATELET 389 127 - 475 K/uL    MPV 11.6 10.8 - 14.1 FL    DF AUTOMATED      NEUTROPHILS 87 (H) 43 - 78 %    LYMPHOCYTES 6 (L) 13 - 44 %    MONOCYTES 6 4.0 - 12.0 %    EOSINOPHILS 0 (L) 0.5 - 7.8 %    BASOPHILS 0 0.0 - 2.0 %    IMMATURE GRANULOCYTES 0.6 0.0 - 5.0 %    ABS. NEUTROPHILS 22.3 (H) 1.7 - 8.2 K/UL    ABS. LYMPHOCYTES 1.6 0.5 - 4.6 K/UL    ABS. MONOCYTES 1.4 (H) 0.1 - 1.3 K/UL    ABS. EOSINOPHILS 0.0 0.0 - 0.8 K/UL    ABS. BASOPHILS 0.0 0.0 - 0.2 K/UL    ABS. IMM. GRANS. 0.1 0.0 - 0.5 K/UL   TRIGLYCERIDE    Collection Time: 08/15/17  6:55 AM   Result Value Ref Range    Triglyceride 1112 (H) 35 - 150 MG/DL   GLUCOSE, POC    Collection Time: 08/15/17  7:02 AM   Result Value Ref Range    Glucose (POC) 131 (H) 65 - 100 mg/dL   GLUCOSE, POC    Collection Time: 08/15/17 11:57 AM   Result Value Ref Range    Glucose (POC) 144 (H) 65 - 100 mg/dL       All Micro Results     Procedure Component Value Units Date/Time    CULTURE, URINE [[id number]] Collected:  08/13/17 2005    Order Status:  Completed Specimen:  Urine from Clean catch Updated:  08/15/17 0815     Special Requests: NO SPECIAL REQUESTS        Culture result:         10,000 to 50,000 COLONIES/mL MIXED SKIN KENNETH ISOLATED          Imaging Alejandro Rosin /Studies:    CXR Results  (Last 48 hours)    None        CT Results  (Last 48 hours)               08/13/17 2303  CT ABD PELV W CONT Final result    Impression:  IMPRESSION:       Findings consistent with acute edematous interstitial pancreatitis. No evidence   of pancreatitis-related complications.  No peripancreatic fluid collection or   pseudocyst.                           Narrative:  CT abdomen and pelvis with contrast        COMPARISON: June 22, 2014. INDICATION: Right upper quadrant and epigastric pain. Lipase 1793. TECHNIQUE: CT imaging was performed of the abdomen and pelvis following the   uncomplicated administration of intravenous contrast (Isovue 370, 100 mL). Oral   contrast was administered. Radiation dose reduction techniques were used for   this study:  Our CT scanners use one or all of the following: Automated exposure   control, adjustment of the mA and/or kVp according to patient's size, iterative   reconstruction. FINDINGS:       Lung bases are unremarkable. Abdomen findings: There is moderate peripancreatic inflammatory stranding and ill-defined fluid,   consistent with pancreatitis. No evidence of pancreatic necrosis. No focal   peripancreatic fluid collection or pseudocyst. Splenic vein appears patent. Gallbladder is unremarkable. Liver is enlarged measuring up to 20 cm in the   craniocaudal dimension. No focal liver lesion. The spleen, adrenal glands, and   the right kidney are unremarkable. There is suggestion of punctate   nonobstructing left renal calculi. No hydronephrosis. Abdominal aorta is normal in course and caliber. Stomach is normal in contour. Small bowel loops are of normal caliber. No small bowel obstruction. No evidence   of lymphadenopathy. Pelvic findings: The colon, appendix, and the urinary bladder are unremarkable. The uterus is not   well evaluated. There is no free air or free fluid. Surrounding bones are intact. No results found. No results found for this visit on 08/13/17.     Labs and Studies from previous 24 hours have been personally reviewed by myself Adolfo Problems    Diagnosis Date Noted    Acute pancreatitis 08/14/2017    Leukocytosis 08/14/2017    Nausea and vomiting 08/14/2017    UTI (urinary tract infection) 08/14/2017    GERD (gastroesophageal reflux disease) 08/14/2017     Hospital Problems as of 8/15/2017  Never Reviewed          Codes Class Noted - Resolved POA    * (Principal)Acute pancreatitis ICD-10-CM: K85.90  ICD-9-CM: 993.5  8/14/2017 - Present Yes        Leukocytosis ICD-10-CM: D72.829  ICD-9-CM: 288.60  8/14/2017 - Present Yes        Nausea and vomiting ICD-10-CM: R11.2  ICD-9-CM: 787.01  8/14/2017 - Present Yes        UTI (urinary tract infection) ICD-10-CM: N39.0  ICD-9-CM: 599.0  8/14/2017 - Present Yes        GERD (gastroesophageal reflux disease) ICD-10-CM: K21.9  ICD-9-CM: 530.81  8/14/2017 - Present Yes              Plan:    Pancreatitis    Due to elevated triglyceride  Continue current mgt with insulin drip and  Gemfibrozil 600mg    Continue to monitor her blood  Glucose  Every 2 hrs   continue to monitor triglyceride levels   q12hrs   Continue D5NS to keep Blood glucose > 300  Discuss with patient that if this does not improve she will need to be transferred for Apheresis  Repeat ct ordered by gi will follow      Tachycardia-   continue ivfls. check lactic acid and procal  And bc for any sepsis  Low threshold to start merrem- will d/w gi    Decreased urine outpt-  unable to get novoa will place bedside commode    Obesity   Weight loss advised    Borderline diabetic / prediabetic  Discuss the need to loose weight   Pt may benefit from metformin prior to dc to aid with weight loss      Type I hyperlipidemia? No current family history possible autosomal recessive   Defer to opt work up       DVT Prophylaxis: heparin  CODE Status:   Plan of Care Discussed with: patient.  Care team.  Medical Risk:  Disposition: pending      Yudith Winston MD  08/15/17

## 2017-08-15 NOTE — PROGRESS NOTES
Problem: Interdisciplinary Rounds  Goal: Interdisciplinary Rounds  Interdisciplinary team rounds were held 8/15/2017 with the following team members:Care Management, Nursing, Nutrition, Pharmacy and Physician. Plan of care discussed. See clinical pathway and/or care plan for interventions and desired outcomes.

## 2017-08-15 NOTE — PROGRESS NOTES
GI DAILY PROGRESS NOTE    Admit Date:  8/13/2017    Today's Date:  8/15/2017    CC:  Acute pancreatitis    Subjective:     Patient reports intense epigastric pain, worsened with movement, as well as diffuse abdominal pain. She hears bowel sounds but has had no flatus or stool. Still having some heartburn and has hx of gerd. She is also concerned at lack of urine output and dark urine. Very tearful.     Medications:   Current Facility-Administered Medications   Medication Dose Route Frequency    sodium chloride (NS) flush 5-10 mL  5-10 mL IntraVENous Q8H    sodium chloride (NS) flush 5-10 mL  5-10 mL IntraVENous PRN    0.9% sodium chloride infusion  150 mL/hr IntraVENous CONTINUOUS    acetaminophen (TYLENOL) tablet 650 mg  650 mg Oral Q4H PRN    oxyCODONE-acetaminophen (PERCOCET) 5-325 mg per tablet 1 Tab  1 Tab Oral Q4H PRN    naloxone (NARCAN) injection 0.4 mg  0.4 mg IntraVENous PRN    ondansetron (ZOFRAN) injection 4 mg  4 mg IntraVENous Q4H PRN    cefTRIAXone (ROCEPHIN) 1 g in 0.9% sodium chloride (MBP/ADV) 50 mL  1 g IntraVENous Q24H    famotidine (PF) (PEPCID) 20 mg in sodium chloride 0.9 % 10 mL injection  20 mg IntraVENous Q12H    docusate sodium (COLACE) capsule 100 mg  100 mg Oral DAILY    bisacodyl (DULCOLAX) tablet 5 mg  5 mg Oral DAILY PRN    Or    bisacodyl (DULCOLAX) suppository 10 mg  10 mg Rectal DAILY PRN    Or    magnesium hydroxide (MILK OF MAGNESIA) 400 mg/5 mL oral suspension 30 mL  30 mL Oral DAILY PRN    promethazine (PHENERGAN) with saline injection 12.5 mg  12.5 mg IntraVENous Q6H PRN    gemfibrozil (LOPID) tablet 600 mg  600 mg Oral ACB&D    insulin lispro (HUMALOG) injection   SubCUTAneous Q6H    dextrose 40% (GLUTOSE) oral gel 1 Tube  15 g Oral PRN    glucagon (GLUCAGEN) injection 1 mg  1 mg IntraMUSCular PRN    dextrose (D50W) injection syrg 12.5-25 g  25-50 mL IntraVENous PRN    insulin regular (NOVOLIN R, HUMULIN R) 100 Units in 0.9% sodium chloride 100 mL infusion  1-10 Units/hr IntraVENous CONTINUOUS    dextrose 5% - 0.9% NaCl with KCl 20 mEq/L infusion  50 mL/hr IntraVENous CONTINUOUS    HYDROmorphone (PF) (DILAUDID) injection 1 mg  1 mg IntraVENous Q3H PRN       Review of Systems:  ROS was obtained, with pertinent positives as listed above. No chest pain or SOB. Diet:  npo    Objective:   Vitals:  Visit Vitals    /75 (BP 1 Location: Right arm, BP Patient Position: At rest)    Pulse (!) 126  Comment: RN notified    Temp 98.2 °F (36.8 °C)    Resp 20    LMP 07/10/2017    SpO2 96%    Breastfeeding No     Intake/Output:  08/15 0701 - 08/15 1900  In: 263 [I.V.:935]  Out: 200 [Urine:200]  08/13 1901 - 08/15 0700  In: 553 [I.V.:553]  Out: 1140 [Urine:1140]  Exam:  General appearance: alert, cooperative, appears uncomfortable  Skin: flushed  Lungs: clear to auscultation bilaterally anteriorly  Heart: tachycardic at 130  Abdomen: soft, diffusely tender but worse in the epigastrium.   Active bowel sounds normal. No masses, no organomegaly  Extremities: extremities normal, atraumatic, no cyanosis or edema  Neuro:  alert and oriented    Data Review (Labs):    Recent Labs      08/15/17   0655  08/14/17   0145  08/13/17 2005   WBC  25.5*   --   18.4*   HGB  11.8   --   13.4   HCT  36.4   --   38.0   PLT  168   --   174   MCV  82.9   --   80.5   NA  137   --   137   K  4.3   --   3.8   CL  105   --   102   CO2  20*   --   20*   BUN  4*   --   6   CREA  0.73   --   0.93   CA  7.0*   --   9.2   MG   --   1.4*   --    GLU  146*   --   169*   AP  61   --   64   SGOT  12*   --   33   ALT  16   --   27   TBILI  1.0   --   0.7   ALB  2.2*   --   3.2*   TP  6.9   --   8.6*   LPSE  550*   --   1793*       Assessment:     Principal Problem:    Acute pancreatitis (8/14/2017)    Active Problems:    Leukocytosis (8/14/2017)      Nausea and vomiting (8/14/2017)      UTI (urinary tract infection) (8/14/2017)      GERD (gastroesophageal reflux disease) (8/14/2017)        Plan:     27 y.o. female with PMH of GERD and kidney stones admitted with sudden onset of generalized mid and lower abd pain. Initial lipase [id number] with normal LFTs. CT with enlarged liver and findings consistent with acute pancreatitis. Gallbladder appeared normal.  She denies ETOH use or new medication. Triglycerides were elevated on admission at 3,595 with total cholesterol of 482; started on Gemfibrozil overnight with improvement in triglycerides today. Lipase improved at 1112 today, however patient remains tachycardic with WBC increased today to 25. She is on IV Rocephin. Although improved labs today, she does not appear clinically better. 1.  Add Protonix for symptomatic gerd  2. Increase IV fluids to 250 ml/hr  3. Increase Dilaudid to 1-2 mg q3 hr  4. Dulcolax supp for constipation per patient request  5. Plan repeat CT for further evaluation tomorrow morning; will cancel if she appears greatly improved tomorrow   6. Follow closely     Patient is seen and examined in collaboration with Dr. Cheryl Cabrera. Assessment and plan as per Dr. Cheryl Cabrera.   Pepe Dias NP

## 2017-08-15 NOTE — PROGRESS NOTES
Dilaudid (1mg/IV) given for pain. Pain is at level of 9 out of 10. Mother is at the bedside. Call light is within reach. Will continue to monitor.

## 2017-08-15 NOTE — PROGRESS NOTES
House supervisor Marty Dunbar) and charge nurse Hang Youngblood) notified of mews score of 5. Pt was in pain and received pain medications but shows no signs of distress. Mom is at the bedside. Vital signs retaken and recorded; mews score is down to 1. Will continue to monitor.

## 2017-08-16 ENCOUNTER — APPOINTMENT (OUTPATIENT)
Dept: CT IMAGING | Age: 27
DRG: 871 | End: 2017-08-16
Attending: NURSE PRACTITIONER
Payer: COMMERCIAL

## 2017-08-16 LAB
ALBUMIN SERPL-MCNC: 2 G/DL (ref 3.5–5)
ALBUMIN/GLOB SERPL: 0.4 {RATIO} (ref 1.2–3.5)
ALP SERPL-CCNC: 75 U/L (ref 50–136)
ALT SERPL-CCNC: 12 U/L (ref 12–65)
ANION GAP SERPL CALC-SCNC: 12 MMOL/L (ref 7–16)
AST SERPL-CCNC: 14 U/L (ref 15–37)
BACTERIA SPEC CULT: NORMAL
BASOPHILS # BLD: 0 K/UL (ref 0–0.2)
BASOPHILS NFR BLD: 0 % (ref 0–2)
BILIRUB DIRECT SERPL-MCNC: 1.6 MG/DL
BILIRUB SERPL-MCNC: 2.5 MG/DL (ref 0.2–1.1)
BUN SERPL-MCNC: 4 MG/DL (ref 6–23)
CALCIUM SERPL-MCNC: 7 MG/DL (ref 8.3–10.4)
CHLORIDE SERPL-SCNC: 99 MMOL/L (ref 98–107)
CO2 SERPL-SCNC: 19 MMOL/L (ref 21–32)
CREAT SERPL-MCNC: 0.78 MG/DL (ref 0.6–1)
DIFFERENTIAL METHOD BLD: ABNORMAL
EOSINOPHIL # BLD: 0 K/UL (ref 0–0.8)
EOSINOPHIL NFR BLD: 0 % (ref 0.5–7.8)
ERYTHROCYTE [DISTWIDTH] IN BLOOD BY AUTOMATED COUNT: 15.5 % (ref 11.9–14.6)
GLOBULIN SER CALC-MCNC: 4.7 G/DL (ref 2.3–3.5)
GLUCOSE BLD STRIP.AUTO-MCNC: 106 MG/DL (ref 65–100)
GLUCOSE BLD STRIP.AUTO-MCNC: 108 MG/DL (ref 65–100)
GLUCOSE BLD STRIP.AUTO-MCNC: 109 MG/DL (ref 65–100)
GLUCOSE BLD STRIP.AUTO-MCNC: 115 MG/DL (ref 65–100)
GLUCOSE BLD STRIP.AUTO-MCNC: 149 MG/DL (ref 65–100)
GLUCOSE BLD STRIP.AUTO-MCNC: 92 MG/DL (ref 65–100)
GLUCOSE BLD STRIP.AUTO-MCNC: 93 MG/DL (ref 65–100)
GLUCOSE SERPL-MCNC: 147 MG/DL (ref 65–100)
HCT VFR BLD AUTO: 33.2 % (ref 35.8–46.3)
HGB BLD-MCNC: 10.5 G/DL (ref 11.7–15.4)
IMM GRANULOCYTES # BLD: 0.1 K/UL (ref 0–0.5)
IMM GRANULOCYTES NFR BLD: 0.6 % (ref 0–5)
LYMPHOCYTES # BLD: 1.4 K/UL (ref 0.5–4.6)
LYMPHOCYTES NFR BLD: 7 % (ref 13–44)
MAGNESIUM SERPL-MCNC: 2.1 MG/DL (ref 1.8–2.4)
MCH RBC QN AUTO: 26.6 PG (ref 26.1–32.9)
MCHC RBC AUTO-ENTMCNC: 31.6 G/DL (ref 31.4–35)
MCV RBC AUTO: 84.3 FL (ref 79.6–97.8)
MONOCYTES # BLD: 1.2 K/UL (ref 0.1–1.3)
MONOCYTES NFR BLD: 6 % (ref 4–12)
NEUTS SEG # BLD: 17.9 K/UL (ref 1.7–8.2)
NEUTS SEG NFR BLD: 86 % (ref 43–78)
PLATELET # BLD AUTO: 165 K/UL (ref 150–450)
PMV BLD AUTO: 11.6 FL (ref 10.8–14.1)
POTASSIUM SERPL-SCNC: 3.8 MMOL/L (ref 3.5–5.1)
PROT SERPL-MCNC: 6.7 G/DL (ref 6.3–8.2)
RBC # BLD AUTO: 3.94 M/UL (ref 4.05–5.25)
SERVICE CMNT-IMP: NORMAL
SODIUM SERPL-SCNC: 130 MMOL/L (ref 136–145)
TRIGL SERPL-MCNC: 403 MG/DL (ref 35–150)
WBC # BLD AUTO: 20.7 K/UL (ref 4.3–11.1)

## 2017-08-16 PROCEDURE — C9113 INJ PANTOPRAZOLE SODIUM, VIA: HCPCS | Performed by: NURSE PRACTITIONER

## 2017-08-16 PROCEDURE — 82962 GLUCOSE BLOOD TEST: CPT

## 2017-08-16 PROCEDURE — 36415 COLL VENOUS BLD VENIPUNCTURE: CPT | Performed by: NURSE PRACTITIONER

## 2017-08-16 PROCEDURE — 74011000250 HC RX REV CODE- 250: Performed by: INTERNAL MEDICINE

## 2017-08-16 PROCEDURE — 80048 BASIC METABOLIC PNL TOTAL CA: CPT | Performed by: INTERNAL MEDICINE

## 2017-08-16 PROCEDURE — 83735 ASSAY OF MAGNESIUM: CPT | Performed by: INTERNAL MEDICINE

## 2017-08-16 PROCEDURE — 74011000258 HC RX REV CODE- 258: Performed by: HOSPITALIST

## 2017-08-16 PROCEDURE — 74011250636 HC RX REV CODE- 250/636: Performed by: INTERNAL MEDICINE

## 2017-08-16 PROCEDURE — P9047 ALBUMIN (HUMAN), 25%, 50ML: HCPCS | Performed by: INTERNAL MEDICINE

## 2017-08-16 PROCEDURE — 65270000029 HC RM PRIVATE

## 2017-08-16 PROCEDURE — 80076 HEPATIC FUNCTION PANEL: CPT | Performed by: NURSE PRACTITIONER

## 2017-08-16 PROCEDURE — 74011250636 HC RX REV CODE- 250/636: Performed by: HOSPITALIST

## 2017-08-16 PROCEDURE — 74011000250 HC RX REV CODE- 250: Performed by: NURSE PRACTITIONER

## 2017-08-16 PROCEDURE — 74011250637 HC RX REV CODE- 250/637: Performed by: HOSPITALIST

## 2017-08-16 PROCEDURE — 74011250636 HC RX REV CODE- 250/636: Performed by: NURSE PRACTITIONER

## 2017-08-16 PROCEDURE — 85025 COMPLETE CBC W/AUTO DIFF WBC: CPT | Performed by: NURSE PRACTITIONER

## 2017-08-16 PROCEDURE — 84478 ASSAY OF TRIGLYCERIDES: CPT | Performed by: INTERNAL MEDICINE

## 2017-08-16 PROCEDURE — 74177 CT ABD & PELVIS W/CONTRAST: CPT

## 2017-08-16 PROCEDURE — 74011000258 HC RX REV CODE- 258: Performed by: INTERNAL MEDICINE

## 2017-08-16 PROCEDURE — 74011250637 HC RX REV CODE- 250/637: Performed by: NURSE PRACTITIONER

## 2017-08-16 PROCEDURE — 74011636320 HC RX REV CODE- 636/320: Performed by: HOSPITALIST

## 2017-08-16 PROCEDURE — 74011000250 HC RX REV CODE- 250: Performed by: HOSPITALIST

## 2017-08-16 RX ORDER — METRONIDAZOLE 500 MG/100ML
500 INJECTION, SOLUTION INTRAVENOUS EVERY 12 HOURS
Status: DISCONTINUED | OUTPATIENT
Start: 2017-08-16 | End: 2017-08-21

## 2017-08-16 RX ORDER — ALBUMIN HUMAN 250 G/1000ML
12.5 SOLUTION INTRAVENOUS EVERY 8 HOURS
Status: COMPLETED | OUTPATIENT
Start: 2017-08-16 | End: 2017-08-17

## 2017-08-16 RX ORDER — SODIUM CHLORIDE 0.9 % (FLUSH) 0.9 %
10 SYRINGE (ML) INJECTION
Status: COMPLETED | OUTPATIENT
Start: 2017-08-16 | End: 2017-08-16

## 2017-08-16 RX ORDER — DEXTROSE MONOHYDRATE 50 MG/ML
25 INJECTION, SOLUTION INTRAVENOUS CONTINUOUS
Status: DISCONTINUED | OUTPATIENT
Start: 2017-08-16 | End: 2017-08-21

## 2017-08-16 RX ADMIN — METRONIDAZOLE 500 MG: 500 INJECTION, SOLUTION INTRAVENOUS at 17:40

## 2017-08-16 RX ADMIN — ONDANSETRON 4 MG: 2 INJECTION INTRAMUSCULAR; INTRAVENOUS at 16:17

## 2017-08-16 RX ADMIN — BISACODYL 10 MG: 10 SUPPOSITORY RECTAL at 16:18

## 2017-08-16 RX ADMIN — FAMOTIDINE 20 MG: 10 INJECTION, SOLUTION INTRAVENOUS at 07:37

## 2017-08-16 RX ADMIN — Medication 10 ML: at 08:54

## 2017-08-16 RX ADMIN — SODIUM CHLORIDE 250 ML/HR: 900 INJECTION, SOLUTION INTRAVENOUS at 03:00

## 2017-08-16 RX ADMIN — HYDROMORPHONE HYDROCHLORIDE 1 MG: 1 INJECTION, SOLUTION INTRAMUSCULAR; INTRAVENOUS; SUBCUTANEOUS at 00:47

## 2017-08-16 RX ADMIN — Medication 5 ML: at 05:26

## 2017-08-16 RX ADMIN — ALBUMIN (HUMAN) 12.5 G: 0.25 INJECTION, SOLUTION INTRAVENOUS at 22:54

## 2017-08-16 RX ADMIN — OXYCODONE HYDROCHLORIDE AND ACETAMINOPHEN 1 TABLET: 5; 325 TABLET ORAL at 14:04

## 2017-08-16 RX ADMIN — GEMFIBROZIL 600 MG: 600 TABLET ORAL at 07:39

## 2017-08-16 RX ADMIN — HYDROMORPHONE HYDROCHLORIDE 1 MG: 1 INJECTION, SOLUTION INTRAMUSCULAR; INTRAVENOUS; SUBCUTANEOUS at 19:41

## 2017-08-16 RX ADMIN — CEFTRIAXONE 1 G: 1 INJECTION, POWDER, FOR SOLUTION INTRAMUSCULAR; INTRAVENOUS at 00:46

## 2017-08-16 RX ADMIN — HYDROMORPHONE HYDROCHLORIDE 1 MG: 1 INJECTION, SOLUTION INTRAMUSCULAR; INTRAVENOUS; SUBCUTANEOUS at 20:04

## 2017-08-16 RX ADMIN — OXYCODONE HYDROCHLORIDE AND ACETAMINOPHEN 1 TABLET: 5; 325 TABLET ORAL at 21:49

## 2017-08-16 RX ADMIN — HYDROMORPHONE HYDROCHLORIDE 2 MG: 1 INJECTION, SOLUTION INTRAMUSCULAR; INTRAVENOUS; SUBCUTANEOUS at 16:17

## 2017-08-16 RX ADMIN — SODIUM CHLORIDE 40 MG: 9 INJECTION INTRAMUSCULAR; INTRAVENOUS; SUBCUTANEOUS at 09:00

## 2017-08-16 RX ADMIN — GEMFIBROZIL 600 MG: 600 TABLET ORAL at 16:18

## 2017-08-16 RX ADMIN — FAMOTIDINE 20 MG: 10 INJECTION, SOLUTION INTRAVENOUS at 21:51

## 2017-08-16 RX ADMIN — HYDROMORPHONE HYDROCHLORIDE 1 MG: 1 INJECTION, SOLUTION INTRAMUSCULAR; INTRAVENOUS; SUBCUTANEOUS at 07:33

## 2017-08-16 RX ADMIN — HYDROMORPHONE HYDROCHLORIDE 1 MG: 1 INJECTION, SOLUTION INTRAMUSCULAR; INTRAVENOUS; SUBCUTANEOUS at 04:19

## 2017-08-16 RX ADMIN — HYDROMORPHONE HYDROCHLORIDE 2 MG: 1 INJECTION, SOLUTION INTRAMUSCULAR; INTRAVENOUS; SUBCUTANEOUS at 11:35

## 2017-08-16 RX ADMIN — CALCIUM GLUCONATE 2 G: 94 INJECTION, SOLUTION INTRAVENOUS at 20:08

## 2017-08-16 RX ADMIN — IOPAMIDOL 100 ML: 755 INJECTION, SOLUTION INTRAVENOUS at 08:54

## 2017-08-16 RX ADMIN — SODIUM CHLORIDE 250 ML/HR: 900 INJECTION, SOLUTION INTRAVENOUS at 16:23

## 2017-08-16 RX ADMIN — DOCUSATE SODIUM 100 MG: 100 CAPSULE, LIQUID FILLED ORAL at 07:39

## 2017-08-16 RX ADMIN — OXYCODONE HYDROCHLORIDE AND ACETAMINOPHEN 1 TABLET: 5; 325 TABLET ORAL at 09:09

## 2017-08-16 RX ADMIN — SODIUM CHLORIDE 200 ML/HR: 900 INJECTION, SOLUTION INTRAVENOUS at 23:01

## 2017-08-16 RX ADMIN — SODIUM CHLORIDE 100 ML: 900 INJECTION, SOLUTION INTRAVENOUS at 08:54

## 2017-08-16 RX ADMIN — ONDANSETRON 4 MG: 2 INJECTION INTRAMUSCULAR; INTRAVENOUS at 03:00

## 2017-08-16 NOTE — PROGRESS NOTES
Hospitalist Progress Note    2017  Admit Date: 2017  7:45 PM   NAME: Hunter Hu   :  1990   DOS:              17  MRN:  [id number]   Attending: Deirdre Hackett MD  PCP:  None  Treatment Team: Attending Provider: Adelaide Hart MD; Consulting Provider: Doris Lancaster MD; Utilization Review: Kevan Arboleda RN    Full Code     SUBJECTIVE:   As previously documented:       08/15/2017    Hunter Hu 27 yr female admitted for acute pancreatitis secondary to hypertriglyceridemia. Initially > 3000 now around 1000. Today pt is tachycardic and complaining of severs abdominal pain and decreased urine ouput. Seen by GI who have increased her pain meds, ivfls and are getting a repeat ct     Plasmapharesis was previously discussed with her- she was not agreeable. She now is if it is needed. 2017- pt seen - she still feels very ill- also very concerned because she has not passed gas or had bm. She is on stool softners. ct abdomen shows worsening changes c/w severe pancreatitis. She continues to be tachycardic with severe pain. 10+ ROS reviewed and negative except for positive in HPI.    No Known Allergies  Current Facility-Administered Medications   Medication Dose Route Frequency    diatrizoate meglumine-d.sodium (MD-GASTROVIEW,GASTROGRAFIN) 66-10 % contrast solution 15 mL  15 mL Oral RAD ONCE    metroNIDAZOLE (FLAGYL) IVPB premix 500 mg  500 mg IntraVENous Q12H    bisacodyl (DULCOLAX) suppository 10 mg  10 mg Rectal DAILY PRN    pantoprazole (PROTONIX) 40 mg in sodium chloride 0.9 % 10 mL injection  40 mg IntraVENous DAILY    HYDROmorphone (PF) (DILAUDID) injection 2 mg  2 mg IntraVENous Q3H PRN    HYDROmorphone (PF) (DILAUDID) injection 1 mg  1 mg IntraVENous Q3H PRN    sodium chloride (NS) flush 5-10 mL  5-10 mL IntraVENous Q8H    sodium chloride (NS) flush 5-10 mL  5-10 mL IntraVENous PRN    0.9% sodium chloride infusion  250 mL/hr IntraVENous CONTINUOUS    acetaminophen (TYLENOL) tablet 650 mg  650 mg Oral Q4H PRN    oxyCODONE-acetaminophen (PERCOCET) 5-325 mg per tablet 1 Tab  1 Tab Oral Q4H PRN    naloxone (NARCAN) injection 0.4 mg  0.4 mg IntraVENous PRN    ondansetron (ZOFRAN) injection 4 mg  4 mg IntraVENous Q4H PRN    cefTRIAXone (ROCEPHIN) 1 g in 0.9% sodium chloride (MBP/ADV) 50 mL  1 g IntraVENous Q24H    famotidine (PF) (PEPCID) 20 mg in sodium chloride 0.9 % 10 mL injection  20 mg IntraVENous Q12H    docusate sodium (COLACE) capsule 100 mg  100 mg Oral DAILY    bisacodyl (DULCOLAX) tablet 5 mg  5 mg Oral DAILY PRN    Or    bisacodyl (DULCOLAX) suppository 10 mg  10 mg Rectal DAILY PRN    Or    magnesium hydroxide (MILK OF MAGNESIA) 400 mg/5 mL oral suspension 30 mL  30 mL Oral DAILY PRN    promethazine (PHENERGAN) with saline injection 12.5 mg  12.5 mg IntraVENous Q6H PRN    gemfibrozil (LOPID) tablet 600 mg  600 mg Oral ACB&D    insulin lispro (HUMALOG) injection   SubCUTAneous Q6H    dextrose 40% (GLUTOSE) oral gel 1 Tube  15 g Oral PRN    glucagon (GLUCAGEN) injection 1 mg  1 mg IntraMUSCular PRN    dextrose (D50W) injection syrg 12.5-25 g  25-50 mL IntraVENous PRN    insulin regular (NOVOLIN R, HUMULIN R) 100 Units in 0.9% sodium chloride 100 mL infusion  1-10 Units/hr IntraVENous CONTINUOUS    dextrose 5% - 0.9% NaCl with KCl 20 mEq/L infusion  50 mL/hr IntraVENous CONTINUOUS         Immunization History   Administered Date(s) Administered    Influenza Vaccine 11/04/2014     Objective:     Patient Vitals for the past 24 hrs:   Temp Pulse Resp BP SpO2   08/16/17 1135 98.8 °F (37.1 °C) (!) 121 20 116/60 98 %   08/16/17 0909 - (!) 112 - - -   08/16/17 0725 98.1 °F (36.7 °C) (!) 132 20 132/75 96 %   08/16/17 0315 99.6 °F (37.6 °C) (!) 120 18 136/77 96 %   08/15/17 2232 99 °F (37.2 °C) (!) 113 20 132/73 95 %   08/15/17 2010 99.6 °F (37.6 °C) (!) 131 24 132/76 95 %   08/15/17 1616 100 °F (37.8 °C) (!) 128 20 137/71 94 % Temp (24hrs), Av.2 °F (37.3 °C), Min:98.1 °F (36.7 °C), Max:100 °F (37.8 °C)    Oxygen Therapy  O2 Sat (%): 98 % (17 1135)  Pulse via Oximetry: 83 beats per minute (17 2320)  Oxygen Therapy  O2 Sat (%): 98 % (17 1135)  Pulse via Oximetry: 83 beats per minute (17 2320)    Physical Exam:  General:         Alert, cooperative, no distress   HEENT:               NCAT. No obvious deformity. Nares normal. No drainage  Lungs:  CTABL. No wheezing/rhonchi/rales  Cardiovascular:   RRR. No m/r/g. No pedal edema b/l. +2 PT/DT pulses b/l. Abdomen:       S/nt/ mild tenderness hypotonic   Bowel sounds normal. .   Skin:         No rashes or lesions. Not Jaundiced  Neurologic:    AAOx3. CN II- XII grossly WNL. No gross focal deficit. Moves all extremities. Normal sensory. Normal gait. Psychiatric:         Good mood. Normal affect. Denies any SI/HI.                DIAGNOSTIC STUDIES      Data Review:   Recent Results (from the past 24 hour(s))   PROCALCITONIN    Collection Time: 08/15/17  3:05 PM   Result Value Ref Range    Procalcitonin 0.3 ng/mL   LACTIC ACID    Collection Time: 08/15/17  3:05 PM   Result Value Ref Range    Lactic acid 1.2 0.4 - 2.0 MMOL/L   GLUCOSE, POC    Collection Time: 08/15/17  4:12 PM   Result Value Ref Range    Glucose (POC) 116 (H) 65 - 100 mg/dL   GLUCOSE, POC    Collection Time: 08/15/17  8:12 PM   Result Value Ref Range    Glucose (POC) 123 (H) 65 - 100 mg/dL   GLUCOSE, POC    Collection Time: 17 12:27 AM   Result Value Ref Range    Glucose (POC) 109 (H) 65 - 100 mg/dL   GLUCOSE, POC    Collection Time: 17  5:53 AM   Result Value Ref Range    Glucose (POC) 108 (H) 65 - 100 mg/dL   GLUCOSE, POC    Collection Time: 17  7:37 AM   Result Value Ref Range    Glucose (POC) 115 (H) 65 - 100 mg/dL   CBC WITH AUTOMATED DIFF    Collection Time: 17  9:18 AM   Result Value Ref Range    WBC 20.7 (H) 4.3 - 11.1 K/uL    RBC 3.94 (L) 4.05 - 5.25 M/uL    HGB 10.5 (L) 11.7 - 15.4 g/dL    HCT 33.2 (L) 35.8 - 46.3 %    MCV 84.3 79.6 - 97.8 FL    MCH 26.6 26.1 - 32.9 PG    MCHC 31.6 31.4 - 35.0 g/dL    RDW 15.5 (H) 11.9 - 14.6 %    PLATELET 643 432 - 631 K/uL    MPV 11.6 10.8 - 14.1 FL    DF AUTOMATED      NEUTROPHILS 86 (H) 43 - 78 %    LYMPHOCYTES 7 (L) 13 - 44 %    MONOCYTES 6 4.0 - 12.0 %    EOSINOPHILS 0 (L) 0.5 - 7.8 %    BASOPHILS 0 0.0 - 2.0 %    IMMATURE GRANULOCYTES 0.6 0.0 - 5.0 %    ABS. NEUTROPHILS 17.9 (H) 1.7 - 8.2 K/UL    ABS. LYMPHOCYTES 1.4 0.5 - 4.6 K/UL    ABS. MONOCYTES 1.2 0.1 - 1.3 K/UL    ABS. EOSINOPHILS 0.0 0.0 - 0.8 K/UL    ABS. BASOPHILS 0.0 0.0 - 0.2 K/UL    ABS. IMM. GRANS. 0.1 0.0 - 0.5 K/UL   HEPATIC FUNCTION PANEL    Collection Time: 08/16/17  9:18 AM   Result Value Ref Range    Protein, total 6.7 6.3 - 8.2 g/dL    Albumin 2.0 (L) 3.5 - 5.0 g/dL    Globulin 4.7 (H) 2.3 - 3.5 g/dL    A-G Ratio 0.4 (L) 1.2 - 3.5      Bilirubin, total 2.5 (H) 0.2 - 1.1 MG/DL    Bilirubin, direct 1.6 (H) <0.4 MG/DL    Alk. phosphatase 75 50 - 136 U/L    AST (SGOT) 14 (L) 15 - 37 U/L    ALT (SGPT) 12 12 - 65 U/L   GLUCOSE, POC    Collection Time: 08/16/17  9:56 AM   Result Value Ref Range    Glucose (POC) 149 (H) 65 - 100 mg/dL   GLUCOSE, POC    Collection Time: 08/16/17  1:45 PM   Result Value Ref Range    Glucose (POC) 93 65 - 100 mg/dL       All Micro Results     Procedure Component Value Units Date/Time    CULTURE, URINE [[id number]] Collected:  08/13/17 2005    Order Status:  Completed Specimen:  Urine from Clean catch Updated:  08/16/17 0702     Special Requests: NO SPECIAL REQUESTS        Culture result:         10,000 to 50,000 COLONIES/mL MIXED SKIN KENNETH ISOLATED          Imaging [facility] /Studies:    CXR Results  (Last 48 hours)    None        CT Results  (Last 48 hours)               08/16/17 0859  CT ABD PELV W CONT Final result    Impression:  IMPRESSION:     1. Findings once again consistent with acute pancreatitis.  However, the degree   of inflammatory changes appear worsened with increasing peripancreatic   mesenteric edema, and increasing likely reactive fluid. Specifically, there is   increasing fluid in the right abdomen, and new fluid seen in the left abdomen,   and small bowel mesenteric leaflets, and in the pelvis. Peritoneal enhancement   is seen particularly in the right paracolic gutter and pelvis which can suggest   developing loculation. 2. New trace right pleural effusion which is likely reactive to the worsening   right abdominal inflammatory changes described above. Narrative:  CT ABDOMEN AND PELVIS WITH INTRAVENOUS CONTRAST DATED 8/16/2017. History: Severe mid and lower abdominal pain since Sunday. History pancreatitis. Comparison: CT abdomen and pelvis with contrast 8/13/2017        Technique:   Multiple contiguous helical CT images reconstructed at 5 mm   intervals were obtained from above the diaphragms through the ischial   tuberosities following oral and 100 cc Isovue-370 without acute complication. All CT scans performed at this facility use one or all of the following:   Automated exposure control, adjustment of the mA and/or kVp according to   patient's size, iterative reconstruction. Findings:   CT ABDOMEN:     Limited evaluation of the lung bases and base of the mediastinum demonstrates a   trace right pleural effusion is seen. The Liver is homogeneous in attenuation. The spleen is homogeneous in   attenuation. No contour deforming or enhancing mass lesions are seen of the   adrenal glands. The gallbladder now contains density likely representing   vicarious excretion of contrast from the patient's recent CT scan. The kidneys   enhance symmetrically and no evidence of hydronephrosis is seen. Nonobstructing   stones are once again seen of the left kidney.        Persistent inflammatory changes are seen which appears centered at the level of   the pancreatic head suggesting acute pancreatitis. No necrosis is currently   seen. However, the degree of mesenteric edema does appear slightly worsened   particularly in the right abdomen. In addition, there is increasing fluid   extending into the right peritoneum and retroperitoneum likely related to this   process. Early peritoneal enhancement is seen in the right paracolic gutter best   appreciated on image 58 which can suggest developing loculation. No gas   containing fluid collection is seen to strongly suggest abscess at this time. Additional new trace fluid is seen in the left paracolic gutter as well as   scattered within small bowel mesenteric leaflets. The visualized loops of small bowel and colon are normal in caliber. The   appendix is felt to be seen on image 75 and is unremarkable. No free air is   seen. No adenopathy is seen. The abdominal aorta is unremarkable in   appearance. CT PELVIS:   New moderate pelvic fluid is seen likely secondary to the pancreatitis. Once   again, there is peritoneal enhancement posteriorly which can suggest developing   loculation. No pelvic adenopathy is seen. The urinary bladder is unremarkable. Ct Abd Pelv W Cont    Result Date: 8/16/2017  IMPRESSION:  1. Findings once again consistent with acute pancreatitis. However, the degree of inflammatory changes appear worsened with increasing peripancreatic mesenteric edema, and increasing likely reactive fluid. Specifically, there is increasing fluid in the right abdomen, and new fluid seen in the left abdomen, and small bowel mesenteric leaflets, and in the pelvis. Peritoneal enhancement is seen particularly in the right paracolic gutter and pelvis which can suggest developing loculation. 2. New trace right pleural effusion which is likely reactive to the worsening right abdominal inflammatory changes described above. No results found for this visit on 08/13/17.     Labs and Studies from previous 24 hours have been personally reviewed by myself    ASSESSMENT      Active Hospital Problems    Diagnosis Date Noted    Acute pancreatitis 08/14/2017    Leukocytosis 08/14/2017    Nausea and vomiting 08/14/2017    UTI (urinary tract infection) 08/14/2017    GERD (gastroesophageal reflux disease) 08/14/2017     Hospital Problems as of 8/16/2017  Never Reviewed          Codes Class Noted - Resolved POA    * (Principal)Acute pancreatitis ICD-10-CM: K85.90  ICD-9-CM: 577.0  8/14/2017 - Present Yes        Leukocytosis ICD-10-CM: D72.829  ICD-9-CM: 288.60  8/14/2017 - Present Yes        Nausea and vomiting ICD-10-CM: R11.2  ICD-9-CM: 787.01  8/14/2017 - Present Yes        UTI (urinary tract infection) ICD-10-CM: N39.0  ICD-9-CM: 599.0  8/14/2017 - Present Yes        GERD (gastroesophageal reflux disease) ICD-10-CM: K21.9  ICD-9-CM: 530.81  8/14/2017 - Present Yes              Plan:    Pancreatitis    Due to elevated triglyceride  Continue current mgt with insulin drip and  Gemfibrozil 600mg    Continue to monitor her blood  Glucose  Every 2 hrs  Continue to monitor triglyceride levels   Continue D5NS to keep Blood glucose > 300  Discuss with patient that if this does not improve she may need to be transferred for Apheresis  Repeat ct shows worsening and severe pancreatitis  GI input appreciated    Likely Sepsis  - pt is tachycardic, with wbc > 20, with severe pancreatitis and possible underlying infection- already on bs abx will follow   - continue supportive care  - continue abx      Tachycardia  continue ivfls. check lactic acid and procal  And bc for any sepsis  GI has added flagyl to to her rocephin    Decreased urine outpt-  unable to get novoa will place bedside commode- urine outpt is good but urine is dark    Obesity   Weight loss advised    Borderline diabetic / prediabetic  Discuss the need to loose weight   Pt may benefit from metformin after dc to aid with weight loss      Type I hyperlipidemia?   No current family history possible autosomal recessive   Defer to opt work up       DVT Prophylaxis: heparin  CODE Status:   Plan of Care Discussed with: patient. Care team.  Medical Risk: high- pt is high risk given her severe pancreatitis, probable sepsis, at increase risk for morbidity and mortality.    Disposition: pending      Zaki Smtih MD  08/16/17

## 2017-08-16 NOTE — PROGRESS NOTES
Shift assessment complete. Alert and oriented x4. Pt resting in bed with multiple family members at bedside. Pt c/o of pain in abdomen. Insulin drip infusing. IVF infusing. Resp even and unlabored. Call light within reach. Pt instructed to call for assistance.

## 2017-08-16 NOTE — PROGRESS NOTES
GI DAILY PROGRESS NOTE    Admit Date:  8/13/2017    Today's Date:  8/16/2017    CC:  Pancreatitis, elevated triglycerides    Subjective:     Patient appears more comfortable this morning but reports no change in her pain overall. Remains tachycardic. She believes abd less distended today. Voiding larger volume overnight after increase in IV fluids.       Medications:   Current Facility-Administered Medications   Medication Dose Route Frequency    sodium chloride 0.9 % bolus infusion 100 mL  100 mL IntraVENous ONCE    saline peripheral flush soln 10 mL  10 mL InterCATHeter RAD ONCE    iopamidol (ISOVUE-370) 76 % injection 100 mL  100 mL IntraVENous RAD ONCE    diatrizoate meglumine-d.sodium (MD-GASTROVIEW,GASTROGRAFIN) 66-10 % contrast solution 15 mL  15 mL Oral RAD ONCE    bisacodyl (DULCOLAX) suppository 10 mg  10 mg Rectal DAILY PRN    pantoprazole (PROTONIX) 40 mg in sodium chloride 0.9 % 10 mL injection  40 mg IntraVENous DAILY    HYDROmorphone (PF) (DILAUDID) injection 2 mg  2 mg IntraVENous Q3H PRN    HYDROmorphone (PF) (DILAUDID) injection 1 mg  1 mg IntraVENous Q3H PRN    sodium chloride (NS) flush 5-10 mL  5-10 mL IntraVENous Q8H    sodium chloride (NS) flush 5-10 mL  5-10 mL IntraVENous PRN    0.9% sodium chloride infusion  250 mL/hr IntraVENous CONTINUOUS    acetaminophen (TYLENOL) tablet 650 mg  650 mg Oral Q4H PRN    oxyCODONE-acetaminophen (PERCOCET) 5-325 mg per tablet 1 Tab  1 Tab Oral Q4H PRN    naloxone (NARCAN) injection 0.4 mg  0.4 mg IntraVENous PRN    ondansetron (ZOFRAN) injection 4 mg  4 mg IntraVENous Q4H PRN    cefTRIAXone (ROCEPHIN) 1 g in 0.9% sodium chloride (MBP/ADV) 50 mL  1 g IntraVENous Q24H    famotidine (PF) (PEPCID) 20 mg in sodium chloride 0.9 % 10 mL injection  20 mg IntraVENous Q12H    docusate sodium (COLACE) capsule 100 mg  100 mg Oral DAILY    bisacodyl (DULCOLAX) tablet 5 mg  5 mg Oral DAILY PRN    Or    bisacodyl (DULCOLAX) suppository 10 mg  10 mg Rectal DAILY PRN    Or    magnesium hydroxide (MILK OF MAGNESIA) 400 mg/5 mL oral suspension 30 mL  30 mL Oral DAILY PRN    promethazine (PHENERGAN) with saline injection 12.5 mg  12.5 mg IntraVENous Q6H PRN    gemfibrozil (LOPID) tablet 600 mg  600 mg Oral ACB&D    insulin lispro (HUMALOG) injection   SubCUTAneous Q6H    dextrose 40% (GLUTOSE) oral gel 1 Tube  15 g Oral PRN    glucagon (GLUCAGEN) injection 1 mg  1 mg IntraMUSCular PRN    dextrose (D50W) injection syrg 12.5-25 g  25-50 mL IntraVENous PRN    insulin regular (NOVOLIN R, HUMULIN R) 100 Units in 0.9% sodium chloride 100 mL infusion  1-10 Units/hr IntraVENous CONTINUOUS    dextrose 5% - 0.9% NaCl with KCl 20 mEq/L infusion  50 mL/hr IntraVENous CONTINUOUS       Review of Systems:  ROS was obtained, with pertinent positives as listed above. No chest pain or SOB. Diet:  npo    Objective:   Vitals:  Visit Vitals    /77 (BP 1 Location: Right arm, BP Patient Position: At rest)    Pulse (!) 120    Temp 99.6 °F (37.6 °C)    Resp 18    LMP 07/10/2017    SpO2 96%    Breastfeeding No     Intake/Output:     08/14 1901 - 08/16 0700  In: 3570 [I.V.:3570]  Out: 1100 [Urine:1100]  Exam:  General appearance: alert, cooperative, no distress  Lungs: clear to auscultation bilaterally anteriorly  Heart: regular rate and rhythm  Abdomen: soft, full with epigastric tenderness to exam.  Active bowel sounds.  No masses, no organomegaly  Extremities: extremities normal, atraumatic, no cyanosis or edema  Neuro:  alert and oriented    Data Review (Labs):    Recent Labs      08/15/17   0655  08/14/17   0145  08/13/17 2005   WBC  25.5*   --   18.4*   HGB  11.8   --   13.4   HCT  36.4   --   38.0   PLT  168   --   174   MCV  82.9   --   80.5   NA  137   --   137   K  4.3   --   3.8   CL  105   --   102   CO2  20*   --   20*   BUN  4*   --   6   CREA  0.73   --   0.93   CA  7.0*   --   9.2   MG   --   1.4*   --    GLU  146*   --   169*   AP  61   --   64 SGOT  12*   --   33   ALT  16   --   27   TBILI  1.0   --   0.7   ALB  2.2*   --   3.2*   TP  6.9   --   8.6*   LPSE  550*   --   1793*       Assessment:     Principal Problem:    Acute pancreatitis (8/14/2017)    Active Problems:    Leukocytosis (8/14/2017)      Nausea and vomiting (8/14/2017)      UTI (urinary tract infection) (8/14/2017)      GERD (gastroesophageal reflux disease) (8/14/2017)        Plan:     27 y.o. female with PMH of GERD and kidney stones admitted with sudden onset of generalized mid and lower abd pain.  Initial lipase [id number] with normal LFTs.  CT with enlarged liver and findings consistent with acute pancreatitis.  Gallbladder appeared normal.  She denies ETOH use or new medication. Triglycerides were elevated on admission at 3,595 with total cholesterol of 482; started on Gemfibrozil overnight with improvement in triglycerides. Patient remains tachycardic but appears more comfortable today overall. 1.  Continue aggressive hydration  2. Morning labs pending  3. On IV Rocephin 1gm q 24hr  4. Proceed with CT scan today to re-evaluate pancreas  5. Supportive care with hydration, pain control      Patient is seen and examined in collaboration with Dr. Lynsey Trejo. Assessment and plan as per Dr. Lynsey Trejo.   Rosemary Rivas NP

## 2017-08-17 LAB
ALBUMIN SERPL-MCNC: 2.3 G/DL (ref 3.5–5)
ANION GAP SERPL CALC-SCNC: 6 MMOL/L (ref 7–16)
BASOPHILS # BLD: 0 K/UL (ref 0–0.2)
BASOPHILS NFR BLD: 0 % (ref 0–2)
BUN SERPL-MCNC: 4 MG/DL (ref 6–23)
CALCIUM SERPL-MCNC: 7.9 MG/DL (ref 8.3–10.4)
CHLORIDE SERPL-SCNC: 102 MMOL/L (ref 98–107)
CO2 SERPL-SCNC: 25 MMOL/L (ref 21–32)
CREAT SERPL-MCNC: 0.62 MG/DL (ref 0.6–1)
DIFFERENTIAL METHOD BLD: ABNORMAL
EOSINOPHIL # BLD: 0.1 K/UL (ref 0–0.8)
EOSINOPHIL NFR BLD: 0 % (ref 0.5–7.8)
ERYTHROCYTE [DISTWIDTH] IN BLOOD BY AUTOMATED COUNT: 15.6 % (ref 11.9–14.6)
GLUCOSE BLD STRIP.AUTO-MCNC: 109 MG/DL (ref 65–100)
GLUCOSE BLD STRIP.AUTO-MCNC: 113 MG/DL (ref 65–100)
GLUCOSE BLD STRIP.AUTO-MCNC: 87 MG/DL (ref 65–100)
GLUCOSE BLD STRIP.AUTO-MCNC: 94 MG/DL (ref 65–100)
GLUCOSE BLD STRIP.AUTO-MCNC: 96 MG/DL (ref 65–100)
GLUCOSE BLD STRIP.AUTO-MCNC: 97 MG/DL (ref 65–100)
GLUCOSE SERPL-MCNC: 123 MG/DL (ref 65–100)
HCT VFR BLD AUTO: 30.3 % (ref 35.8–46.3)
HGB BLD-MCNC: 9.4 G/DL (ref 11.7–15.4)
IMM GRANULOCYTES # BLD: 0.1 K/UL (ref 0–0.5)
IMM GRANULOCYTES NFR BLD: 0.6 % (ref 0–5)
LYMPHOCYTES # BLD: 1.4 K/UL (ref 0.5–4.6)
LYMPHOCYTES NFR BLD: 8 % (ref 13–44)
MCH RBC QN AUTO: 26.2 PG (ref 26.1–32.9)
MCHC RBC AUTO-ENTMCNC: 31 G/DL (ref 31.4–35)
MCV RBC AUTO: 84.4 FL (ref 79.6–97.8)
MONOCYTES # BLD: 1.3 K/UL (ref 0.1–1.3)
MONOCYTES NFR BLD: 8 % (ref 4–12)
NEUTS SEG # BLD: 14 K/UL (ref 1.7–8.2)
NEUTS SEG NFR BLD: 83 % (ref 43–78)
PLATELET # BLD AUTO: 160 K/UL (ref 150–450)
PMV BLD AUTO: 10.7 FL (ref 10.8–14.1)
POTASSIUM SERPL-SCNC: 3.7 MMOL/L (ref 3.5–5.1)
RBC # BLD AUTO: 3.59 M/UL (ref 4.05–5.25)
SODIUM SERPL-SCNC: 133 MMOL/L (ref 136–145)
TRIGL SERPL-MCNC: 415 MG/DL (ref 35–150)
WBC # BLD AUTO: 16.8 K/UL (ref 4.3–11.1)

## 2017-08-17 PROCEDURE — 74011250636 HC RX REV CODE- 250/636: Performed by: INTERNAL MEDICINE

## 2017-08-17 PROCEDURE — 84478 ASSAY OF TRIGLYCERIDES: CPT | Performed by: INTERNAL MEDICINE

## 2017-08-17 PROCEDURE — 65270000029 HC RM PRIVATE

## 2017-08-17 PROCEDURE — 74011250636 HC RX REV CODE- 250/636: Performed by: HOSPITALIST

## 2017-08-17 PROCEDURE — 80048 BASIC METABOLIC PNL TOTAL CA: CPT | Performed by: INTERNAL MEDICINE

## 2017-08-17 PROCEDURE — 74011250637 HC RX REV CODE- 250/637: Performed by: NURSE PRACTITIONER

## 2017-08-17 PROCEDURE — 82962 GLUCOSE BLOOD TEST: CPT

## 2017-08-17 PROCEDURE — 74011250637 HC RX REV CODE- 250/637: Performed by: HOSPITALIST

## 2017-08-17 PROCEDURE — 74011000250 HC RX REV CODE- 250: Performed by: INTERNAL MEDICINE

## 2017-08-17 PROCEDURE — 82040 ASSAY OF SERUM ALBUMIN: CPT | Performed by: INTERNAL MEDICINE

## 2017-08-17 PROCEDURE — 74011250636 HC RX REV CODE- 250/636: Performed by: NURSE PRACTITIONER

## 2017-08-17 PROCEDURE — 74011000250 HC RX REV CODE- 250: Performed by: NURSE PRACTITIONER

## 2017-08-17 PROCEDURE — 74011000250 HC RX REV CODE- 250: Performed by: HOSPITALIST

## 2017-08-17 PROCEDURE — C9113 INJ PANTOPRAZOLE SODIUM, VIA: HCPCS | Performed by: NURSE PRACTITIONER

## 2017-08-17 PROCEDURE — 85025 COMPLETE CBC W/AUTO DIFF WBC: CPT | Performed by: INTERNAL MEDICINE

## 2017-08-17 PROCEDURE — 94762 N-INVAS EAR/PLS OXIMTRY CONT: CPT

## 2017-08-17 PROCEDURE — P9047 ALBUMIN (HUMAN), 25%, 50ML: HCPCS | Performed by: INTERNAL MEDICINE

## 2017-08-17 PROCEDURE — 94760 N-INVAS EAR/PLS OXIMETRY 1: CPT

## 2017-08-17 PROCEDURE — 36415 COLL VENOUS BLD VENIPUNCTURE: CPT | Performed by: INTERNAL MEDICINE

## 2017-08-17 PROCEDURE — 74011000258 HC RX REV CODE- 258: Performed by: HOSPITALIST

## 2017-08-17 RX ADMIN — SODIUM CHLORIDE 200 ML/HR: 900 INJECTION, SOLUTION INTRAVENOUS at 16:59

## 2017-08-17 RX ADMIN — HYDROMORPHONE HYDROCHLORIDE 2 MG: 1 INJECTION, SOLUTION INTRAMUSCULAR; INTRAVENOUS; SUBCUTANEOUS at 07:42

## 2017-08-17 RX ADMIN — HYDROMORPHONE HYDROCHLORIDE 2 MG: 1 INJECTION, SOLUTION INTRAMUSCULAR; INTRAVENOUS; SUBCUTANEOUS at 00:44

## 2017-08-17 RX ADMIN — OXYCODONE HYDROCHLORIDE AND ACETAMINOPHEN 1 TABLET: 5; 325 TABLET ORAL at 18:44

## 2017-08-17 RX ADMIN — HYDROMORPHONE HYDROCHLORIDE 2 MG: 1 INJECTION, SOLUTION INTRAMUSCULAR; INTRAVENOUS; SUBCUTANEOUS at 05:34

## 2017-08-17 RX ADMIN — SODIUM CHLORIDE 40 MG: 9 INJECTION INTRAMUSCULAR; INTRAVENOUS; SUBCUTANEOUS at 07:50

## 2017-08-17 RX ADMIN — OXYCODONE HYDROCHLORIDE AND ACETAMINOPHEN 1 TABLET: 5; 325 TABLET ORAL at 03:08

## 2017-08-17 RX ADMIN — HYDROMORPHONE HYDROCHLORIDE 2 MG: 1 INJECTION, SOLUTION INTRAMUSCULAR; INTRAVENOUS; SUBCUTANEOUS at 16:47

## 2017-08-17 RX ADMIN — OXYCODONE HYDROCHLORIDE AND ACETAMINOPHEN 1 TABLET: 5; 325 TABLET ORAL at 23:45

## 2017-08-17 RX ADMIN — ALBUMIN (HUMAN) 12.5 G: 0.25 INJECTION, SOLUTION INTRAVENOUS at 07:55

## 2017-08-17 RX ADMIN — HYDROMORPHONE HYDROCHLORIDE 2 MG: 1 INJECTION, SOLUTION INTRAMUSCULAR; INTRAVENOUS; SUBCUTANEOUS at 21:21

## 2017-08-17 RX ADMIN — SODIUM CHLORIDE 200 ML/HR: 900 INJECTION, SOLUTION INTRAVENOUS at 22:52

## 2017-08-17 RX ADMIN — SODIUM CHLORIDE 200 ML/HR: 900 INJECTION, SOLUTION INTRAVENOUS at 05:33

## 2017-08-17 RX ADMIN — ACETAMINOPHEN 650 MG: 325 TABLET, FILM COATED ORAL at 00:09

## 2017-08-17 RX ADMIN — METRONIDAZOLE 500 MG: 500 INJECTION, SOLUTION INTRAVENOUS at 16:47

## 2017-08-17 RX ADMIN — DEXTROSE MONOHYDRATE 50 ML/HR: 5 INJECTION, SOLUTION INTRAVENOUS at 01:02

## 2017-08-17 RX ADMIN — FAMOTIDINE 20 MG: 10 INJECTION, SOLUTION INTRAVENOUS at 07:52

## 2017-08-17 RX ADMIN — DEXTROSE MONOHYDRATE 50 ML/HR: 5 INJECTION, SOLUTION INTRAVENOUS at 22:52

## 2017-08-17 RX ADMIN — HYDROMORPHONE HYDROCHLORIDE 2 MG: 1 INJECTION, SOLUTION INTRAMUSCULAR; INTRAVENOUS; SUBCUTANEOUS at 12:59

## 2017-08-17 RX ADMIN — FAMOTIDINE 20 MG: 10 INJECTION, SOLUTION INTRAVENOUS at 21:21

## 2017-08-17 RX ADMIN — ALBUMIN (HUMAN) 12.5 G: 0.25 INJECTION, SOLUTION INTRAVENOUS at 14:24

## 2017-08-17 RX ADMIN — CEFTRIAXONE 1 G: 1 INJECTION, POWDER, FOR SOLUTION INTRAMUSCULAR; INTRAVENOUS at 23:48

## 2017-08-17 RX ADMIN — DOCUSATE SODIUM 100 MG: 100 CAPSULE, LIQUID FILLED ORAL at 07:55

## 2017-08-17 RX ADMIN — Medication 10 ML: at 21:21

## 2017-08-17 RX ADMIN — METRONIDAZOLE 500 MG: 500 INJECTION, SOLUTION INTRAVENOUS at 04:20

## 2017-08-17 RX ADMIN — GEMFIBROZIL 600 MG: 600 TABLET ORAL at 16:47

## 2017-08-17 RX ADMIN — OXYCODONE HYDROCHLORIDE AND ACETAMINOPHEN 1 TABLET: 5; 325 TABLET ORAL at 09:20

## 2017-08-17 RX ADMIN — ONDANSETRON 4 MG: 2 INJECTION INTRAMUSCULAR; INTRAVENOUS at 07:56

## 2017-08-17 RX ADMIN — GEMFIBROZIL 600 MG: 600 TABLET ORAL at 07:55

## 2017-08-17 RX ADMIN — OXYCODONE HYDROCHLORIDE AND ACETAMINOPHEN 1 TABLET: 5; 325 TABLET ORAL at 14:24

## 2017-08-17 RX ADMIN — BISACODYL 10 MG: 10 SUPPOSITORY RECTAL at 14:25

## 2017-08-17 RX ADMIN — CEFTRIAXONE 1 G: 1 INJECTION, POWDER, FOR SOLUTION INTRAMUSCULAR; INTRAVENOUS at 00:48

## 2017-08-17 NOTE — PROGRESS NOTES
GI DAILY PROGRESS NOTE    Admit Date:  8/13/2017    Today's Date:  8/17/2017    CC:  Pancreatitis, elevated triglycerides    Subjective:     Patient more comfortable today, reports a rough night and felt like she did not receive appropriate care from nursing, however today has been better. Abd feels less distended to her, minimal flatus and no stool. Pain overall less than yesterday. Mother at bedside.       Medications:   Current Facility-Administered Medications   Medication Dose Route Frequency    metroNIDAZOLE (FLAGYL) IVPB premix 500 mg  500 mg IntraVENous Q12H    albumin human 25% (BUMINATE) solution 12.5 g  12.5 g IntraVENous Q8H    dextrose 5% infusion  50 mL/hr IntraVENous CONTINUOUS    bisacodyl (DULCOLAX) suppository 10 mg  10 mg Rectal DAILY PRN    pantoprazole (PROTONIX) 40 mg in sodium chloride 0.9 % 10 mL injection  40 mg IntraVENous DAILY    HYDROmorphone (PF) (DILAUDID) injection 2 mg  2 mg IntraVENous Q3H PRN    HYDROmorphone (PF) (DILAUDID) injection 1 mg  1 mg IntraVENous Q3H PRN    sodium chloride (NS) flush 5-10 mL  5-10 mL IntraVENous Q8H    sodium chloride (NS) flush 5-10 mL  5-10 mL IntraVENous PRN    0.9% sodium chloride infusion  200 mL/hr IntraVENous CONTINUOUS    acetaminophen (TYLENOL) tablet 650 mg  650 mg Oral Q4H PRN    oxyCODONE-acetaminophen (PERCOCET) 5-325 mg per tablet 1 Tab  1 Tab Oral Q4H PRN    naloxone (NARCAN) injection 0.4 mg  0.4 mg IntraVENous PRN    ondansetron (ZOFRAN) injection 4 mg  4 mg IntraVENous Q4H PRN    cefTRIAXone (ROCEPHIN) 1 g in 0.9% sodium chloride (MBP/ADV) 50 mL  1 g IntraVENous Q24H    famotidine (PF) (PEPCID) 20 mg in sodium chloride 0.9 % 10 mL injection  20 mg IntraVENous Q12H    docusate sodium (COLACE) capsule 100 mg  100 mg Oral DAILY    bisacodyl (DULCOLAX) tablet 5 mg  5 mg Oral DAILY PRN    Or    bisacodyl (DULCOLAX) suppository 10 mg  10 mg Rectal DAILY PRN    Or    magnesium hydroxide (MILK OF MAGNESIA) 400 mg/5 mL oral suspension 30 mL  30 mL Oral DAILY PRN    promethazine (PHENERGAN) with saline injection 12.5 mg  12.5 mg IntraVENous Q6H PRN    gemfibrozil (LOPID) tablet 600 mg  600 mg Oral ACB&D    insulin lispro (HUMALOG) injection   SubCUTAneous Q6H    dextrose 40% (GLUTOSE) oral gel 1 Tube  15 g Oral PRN    glucagon (GLUCAGEN) injection 1 mg  1 mg IntraMUSCular PRN    dextrose (D50W) injection syrg 12.5-25 g  25-50 mL IntraVENous PRN    insulin regular (NOVOLIN R, HUMULIN R) 100 Units in 0.9% sodium chloride 100 mL infusion  1-10 Units/hr IntraVENous CONTINUOUS       Review of Systems:  ROS was obtained, with pertinent positives as listed above. No chest pain or SOB. Diet:  npo    Objective:   Vitals:  Visit Vitals    /70 (BP 1 Location: Right arm, BP Patient Position: At rest;Supine)    Pulse (!) 119  Comment: RN notified    Temp 98.7 °F (37.1 °C)    Resp 18    Ht 5' 4\" (1.626 m)    Wt 89.8 kg (198 lb)    LMP 07/10/2017    SpO2 97%    Breastfeeding No    BMI 33.99 kg/m2     Intake/Output:  08/17 0701 - 08/17 1900  In: -   Out: 600 [Urine:600]  08/15 1901 - 08/17 0700  In: 2800 [I.V.:2800]  Out: 2550 [Urine:2550]  Exam:  General appearance: alert, cooperative, no distress  Lungs: clear to auscultation bilaterally anteriorly  Heart: regular rate and rhythm, no tachycardia at present  Abdomen: soft, full but improved from yesterday's exam, epigastric tenderness to exam.  Active bowel sounds.  No masses, no organomegaly  Extremities: extremities normal, atraumatic, no cyanosis or edema  Neuro:  alert and oriented    Data Review (Labs):    Recent Labs      08/17/17   1118  08/16/17   0950  08/16/17   0918  08/15/17   0655   WBC  16.8*   --   20.7*  25.5*   HGB  9.4*   --   10.5*  11.8   HCT  30.3*   --   33.2*  36.4   PLT  160   --   165  168   MCV  84.4   --   84.3  82.9   NA  133*  130*   --   137   K  3.7  3.8   --   4.3   CL  102  99   --   105   CO2  25  19*   --   20*   BUN  4*  4*   --   4*   CREA 0. 62  0.78   --   0.73   CA  7.9*  7.0*   --   7.0*   MG   --   2.1   --    --    GLU  123*  147*   --   146*   AP   --    --   75  61   SGOT   --    --   14*  12*   ALT   --    --   12  16   TBILI   --    --   2.5*  1.0   CBIL   --    --   1.6*   --    ALB  2.3*   --   2.0*  2.2*   TP   --    --   6.7  6.9   LPSE   --    --    --   550*       Assessment:     Principal Problem:    Acute pancreatitis (8/14/2017)    Active Problems:    Leukocytosis (8/14/2017)      Nausea and vomiting (8/14/2017)      UTI (urinary tract infection) (8/14/2017)      GERD (gastroesophageal reflux disease) (8/14/2017)        Plan:     27 y.o. female with PMH of GERD and kidney stones admitted with sudden onset of generalized mid and lower abd pain.  Initial lipase tdo1180 with normal LFTs.  CT with enlarged liver and findings consistent with acute pancreatitis, worsened on repeat CT scan.  Gallbladder appeared normal.  She denies ETOH use or new medication. Triglycerides were elevated on admission at 3,595 with total cholesterol of 482; started on Gemfibrozil with vast improvement in triglycerides. Patient remains mildly but intermittently tachycardic but appears more comfortable today overall with less pain and distention. 1.  Continue aggressive hydration  2. Continue Rocephin and Flagyl  3. Supportive care with pain medication and anti-emetics  4. NPO    Patient is seen and examined in collaboration with Dr. Cheryl Cabrera. Assessment and plan as per Dr. Cheryl Cabrera.   Pepe Dias NP

## 2017-08-17 NOTE — PROGRESS NOTES
Problem: Interdisciplinary Rounds  Goal: Interdisciplinary Rounds  Interdisciplinary team rounds were held 8/17/2017 with the following team members:Care Management, Nursing, Nutrition, Pharmacy and Physician. Plan of care discussed. See clinical pathway and/or care plan for interventions and desired outcomes.

## 2017-08-17 NOTE — PROGRESS NOTES
Pt requested pain medicine for 6/10 abdominal pain. See MAR and doc flow sheet. Will continue to monitor.

## 2017-08-17 NOTE — PROGRESS NOTES
Hospitalist Progress Note    2017  Admit Date: 2017  7:45 PM   NAME: Ciarra Henderson   :  1990   DOS:              17  MRN:  [id number]   Attending: Aric Vernon MD  PCP:  None  Treatment Team: Attending Provider: Mary Kate Aviles MD; Consulting Provider: Shu Lainez MD; Utilization Review: Rosita Stinson RN    Full Code     SUBJECTIVE:   As previously documented:       08/15/2017    Ciarra Late 27 yr female admitted for acute pancreatitis secondary to hypertriglyceridemia. Initially > 3000 now around 1000. Today pt is tachycardic and complaining of severs abdominal pain and decreased urine ouput. Seen by GI who have increased her pain meds, ivfls and are getting a repeat ct     Plasmapharesis was previously discussed with her- she was not agreeable. She now is if it is needed. 2017- pt seen - she still feels very ill- also very concerned because she has not passed gas or had bm. She is on stool softners. ct abdomen shows worsening changes c/w severe pancreatitis. She continues to be tachycardic with severe pain. 2017- seen febrile overnight but now doing better- wbc is better pain improved- attempting to have a shower          10+ ROS reviewed and negative except for positive in HPI.    No Known Allergies  Current Facility-Administered Medications   Medication Dose Route Frequency    metroNIDAZOLE (FLAGYL) IVPB premix 500 mg  500 mg IntraVENous Q12H    dextrose 5% infusion  50 mL/hr IntraVENous CONTINUOUS    bisacodyl (DULCOLAX) suppository 10 mg  10 mg Rectal DAILY PRN    pantoprazole (PROTONIX) 40 mg in sodium chloride 0.9 % 10 mL injection  40 mg IntraVENous DAILY    HYDROmorphone (PF) (DILAUDID) injection 2 mg  2 mg IntraVENous Q3H PRN    HYDROmorphone (PF) (DILAUDID) injection 1 mg  1 mg IntraVENous Q3H PRN    sodium chloride (NS) flush 5-10 mL  5-10 mL IntraVENous Q8H    sodium chloride (NS) flush 5-10 mL  5-10 mL IntraVENous PRN  0.9% sodium chloride infusion  200 mL/hr IntraVENous CONTINUOUS    acetaminophen (TYLENOL) tablet 650 mg  650 mg Oral Q4H PRN    oxyCODONE-acetaminophen (PERCOCET) 5-325 mg per tablet 1 Tab  1 Tab Oral Q4H PRN    naloxone (NARCAN) injection 0.4 mg  0.4 mg IntraVENous PRN    ondansetron (ZOFRAN) injection 4 mg  4 mg IntraVENous Q4H PRN    cefTRIAXone (ROCEPHIN) 1 g in 0.9% sodium chloride (MBP/ADV) 50 mL  1 g IntraVENous Q24H    famotidine (PF) (PEPCID) 20 mg in sodium chloride 0.9 % 10 mL injection  20 mg IntraVENous Q12H    docusate sodium (COLACE) capsule 100 mg  100 mg Oral DAILY    bisacodyl (DULCOLAX) tablet 5 mg  5 mg Oral DAILY PRN    Or    bisacodyl (DULCOLAX) suppository 10 mg  10 mg Rectal DAILY PRN    Or    magnesium hydroxide (MILK OF MAGNESIA) 400 mg/5 mL oral suspension 30 mL  30 mL Oral DAILY PRN    promethazine (PHENERGAN) with saline injection 12.5 mg  12.5 mg IntraVENous Q6H PRN    gemfibrozil (LOPID) tablet 600 mg  600 mg Oral ACB&D    insulin lispro (HUMALOG) injection   SubCUTAneous Q6H    dextrose 40% (GLUTOSE) oral gel 1 Tube  15 g Oral PRN    glucagon (GLUCAGEN) injection 1 mg  1 mg IntraMUSCular PRN    dextrose (D50W) injection syrg 12.5-25 g  25-50 mL IntraVENous PRN    insulin regular (NOVOLIN R, HUMULIN R) 100 Units in 0.9% sodium chloride 100 mL infusion  1-10 Units/hr IntraVENous CONTINUOUS         Immunization History   Administered Date(s) Administered    Influenza Vaccine 2014     Objective:     Patient Vitals for the past 24 hrs:   Temp Pulse Resp BP SpO2   17 1456 98.8 °F (37.1 °C) (!) 117 16 133/73 97 %   17 1156 98.7 °F (37.1 °C) (!) 119 18 118/70 97 %   17 0835 - - - - 98 %   17 0734 96.2 °F (35.7 °C) (!) 117 16 132/74 99 %   17 0305 98.8 °F (37.1 °C) (!) 110 18 129/62 94 %   17 2326 (!) 100.5 °F (38.1 °C) (!) 119 25 115/58 93 %   17 1800 99.6 °F (37.6 °C) (!) 121 18 128/87 95 %     Temp (24hrs), Av.8 °F (37.1 °C), Min:96.2 °F (35.7 °C), Max:100.5 °F (38.1 °C)    Oxygen Therapy  O2 Sat (%): 97 % (08/17/17 1456)  Pulse via Oximetry: 119 beats per minute (08/17/17 0835)  O2 Device: Room air (08/17/17 0835)  Oxygen Therapy  O2 Sat (%): 97 % (08/17/17 1456)  Pulse via Oximetry: 119 beats per minute (08/17/17 0835)  O2 Device: Room air (08/17/17 0835)    Physical Exam:  General:         Alert, cooperative, no distress   HEENT:               NCAT. No obvious deformity. Nares normal. No drainage  Lungs:  CTABL. No wheezing/rhonchi/rales  Cardiovascular:   RRR. No m/r/g. No pedal edema b/l. +2 PT/DT pulses b/l. Abdomen:       S/nt/ mild tenderness hypotonic   Bowel sounds normal.   Skin:         No rashes or lesions. Not Jaundiced  Neurologic:    AAOx3. CN II- XII grossly WNL. No gross focal deficit. Moves all extremities. Normal sensory. Normal gait. Psychiatric:         Good mood. Normal affect. Denies any SI/HI.                DIAGNOSTIC STUDIES      Data Review:   Recent Results (from the past 24 hour(s))   GLUCOSE, POC    Collection Time: 08/16/17  5:59 PM   Result Value Ref Range    Glucose (POC) 106 (H) 65 - 100 mg/dL   GLUCOSE, POC    Collection Time: 08/16/17  9:44 PM   Result Value Ref Range    Glucose (POC) 92 65 - 100 mg/dL   GLUCOSE, POC    Collection Time: 08/17/17  1:04 AM   Result Value Ref Range    Glucose (POC) 109 (H) 65 - 100 mg/dL   GLUCOSE, POC    Collection Time: 08/17/17  6:02 AM   Result Value Ref Range    Glucose (POC) 97 65 - 100 mg/dL   GLUCOSE, POC    Collection Time: 08/17/17  7:36 AM   Result Value Ref Range    Glucose (POC) 94 65 - 100 mg/dL   CBC WITH AUTOMATED DIFF    Collection Time: 08/17/17 11:18 AM   Result Value Ref Range    WBC 16.8 (H) 4.3 - 11.1 K/uL    RBC 3.59 (L) 4.05 - 5.25 M/uL    HGB 9.4 (L) 11.7 - 15.4 g/dL    HCT 30.3 (L) 35.8 - 46.3 %    MCV 84.4 79.6 - 97.8 FL    MCH 26.2 26.1 - 32.9 PG    MCHC 31.0 (L) 31.4 - 35.0 g/dL    RDW 15.6 (H) 11.9 - 14.6 %    PLATELET 674 150 - 450 K/uL    MPV 10.7 (L) 10.8 - 14.1 FL    DF AUTOMATED      NEUTROPHILS 83 (H) 43 - 78 %    LYMPHOCYTES 8 (L) 13 - 44 %    MONOCYTES 8 4.0 - 12.0 %    EOSINOPHILS 0 (L) 0.5 - 7.8 %    BASOPHILS 0 0.0 - 2.0 %    IMMATURE GRANULOCYTES 0.6 0.0 - 5.0 %    ABS. NEUTROPHILS 14.0 (H) 1.7 - 8.2 K/UL    ABS. LYMPHOCYTES 1.4 0.5 - 4.6 K/UL    ABS. MONOCYTES 1.3 0.1 - 1.3 K/UL    ABS. EOSINOPHILS 0.1 0.0 - 0.8 K/UL    ABS. BASOPHILS 0.0 0.0 - 0.2 K/UL    ABS. IMM. GRANS. 0.1 0.0 - 0.5 K/UL   METABOLIC PANEL, BASIC    Collection Time: 08/17/17 11:18 AM   Result Value Ref Range    Sodium 133 (L) 136 - 145 mmol/L    Potassium 3.7 3.5 - 5.1 mmol/L    Chloride 102 98 - 107 mmol/L    CO2 25 21 - 32 mmol/L    Anion gap 6 (L) 7 - 16 mmol/L    Glucose 123 (H) 65 - 100 mg/dL    BUN 4 (L) 6 - 23 MG/DL    Creatinine 0.62 0.6 - 1.0 MG/DL    GFR est AA >60 >60 ml/min/1.73m2    GFR est non-AA >60 >60 ml/min/1.73m2    Calcium 7.9 (L) 8.3 - 10.4 MG/DL   TRIGLYCERIDE    Collection Time: 08/17/17 11:18 AM   Result Value Ref Range    Triglyceride 415 (H) 35 - 150 MG/DL   ALBUMIN    Collection Time: 08/17/17 11:18 AM   Result Value Ref Range    Albumin 2.3 (L) 3.5 - 5.0 g/dL   GLUCOSE, POC    Collection Time: 08/17/17 11:34 AM   Result Value Ref Range    Glucose (POC) 113 (H) 65 - 100 mg/dL       All Micro Results     Procedure Component Value Units Date/Time    CULTURE, URINE [[id number]] Collected:  08/13/17 2005    Order Status:  Completed Specimen:  Urine from Clean catch Updated:  08/16/17 0702     Special Requests: NO SPECIAL REQUESTS        Culture result:         10,000 to 50,000 COLONIES/mL MIXED SKIN KENNETH ISOLATED          Imaging [facility] [place] /Studies:    CXR Results  (Last 48 hours)    None        CT Results  (Last 48 hours)               08/16/17 0859  CT ABD PELV W CONT Final result    Impression:  IMPRESSION:     1. Findings once again consistent with acute pancreatitis.  However, the degree   of inflammatory changes appear worsened with increasing peripancreatic   mesenteric edema, and increasing likely reactive fluid. Specifically, there is   increasing fluid in the right abdomen, and new fluid seen in the left abdomen,   and small bowel mesenteric leaflets, and in the pelvis. Peritoneal enhancement   is seen particularly in the right paracolic gutter and pelvis which can suggest   developing loculation. 2. New trace right pleural effusion which is likely reactive to the worsening   right abdominal inflammatory changes described above. Narrative:  CT ABDOMEN AND PELVIS WITH INTRAVENOUS CONTRAST DATED 8/16/2017. History: Severe mid and lower abdominal pain since Sunday. History pancreatitis. Comparison: CT abdomen and pelvis with contrast 8/13/2017        Technique:   Multiple contiguous helical CT images reconstructed at 5 mm   intervals were obtained from above the diaphragms through the ischial   tuberosities following oral and 100 cc Isovue-370 without acute complication. All CT scans performed at this facility use one or all of the following:   Automated exposure control, adjustment of the mA and/or kVp according to   patient's size, iterative reconstruction. Findings:   CT ABDOMEN:     Limited evaluation of the lung bases and base of the mediastinum demonstrates a   trace right pleural effusion is seen. The Liver is homogeneous in attenuation. The spleen is homogeneous in   attenuation. No contour deforming or enhancing mass lesions are seen of the   adrenal glands. The gallbladder now contains density likely representing   vicarious excretion of contrast from the patient's recent CT scan. The kidneys   enhance symmetrically and no evidence of hydronephrosis is seen. Nonobstructing   stones are once again seen of the left kidney. Persistent inflammatory changes are seen which appears centered at the level of   the pancreatic head suggesting acute pancreatitis.  No necrosis is currently   seen. However, the degree of mesenteric edema does appear slightly worsened   particularly in the right abdomen. In addition, there is increasing fluid   extending into the right peritoneum and retroperitoneum likely related to this   process. Early peritoneal enhancement is seen in the right paracolic gutter best   appreciated on image 58 which can suggest developing loculation. No gas   containing fluid collection is seen to strongly suggest abscess at this time. Additional new trace fluid is seen in the left paracolic gutter as well as   scattered within small bowel mesenteric leaflets. The visualized loops of small bowel and colon are normal in caliber. The   appendix is felt to be seen on image 75 and is unremarkable. No free air is   seen. No adenopathy is seen. The abdominal aorta is unremarkable in   appearance. CT PELVIS:   New moderate pelvic fluid is seen likely secondary to the pancreatitis. Once   again, there is peritoneal enhancement posteriorly which can suggest developing   loculation. No pelvic adenopathy is seen. The urinary bladder is unremarkable. No results found. No results found for this visit on 08/13/17.     Labs and Studies from previous 24 hours have been personally reviewed by myself [place] Problems    Diagnosis Date Noted    Acute pancreatitis 08/14/2017    Leukocytosis 08/14/2017    Nausea and vomiting 08/14/2017    UTI (urinary tract infection) 08/14/2017    GERD (gastroesophageal reflux disease) 08/14/2017     Hospital Problems as of 8/17/2017  Never Reviewed          Codes Class Noted - Resolved POA    * (Principal)Acute pancreatitis ICD-10-CM: K85.90  ICD-9-CM: 577.0  8/14/2017 - Present Yes        Leukocytosis ICD-10-CM: N95.829  ICD-9-CM: 288.60  8/14/2017 - Present Yes        Nausea and vomiting ICD-10-CM: R11.2  ICD-9-CM: 787.01  8/14/2017 - Present Yes        UTI (urinary tract infection) ICD-10-CM: N39.0  ICD-9-CM: 599.0  8/14/2017 - Present Yes        GERD (gastroesophageal reflux disease) ICD-10-CM: K21.9  ICD-9-CM: 530.81  8/14/2017 - Present Yes              Plan:    Pancreatitis    Due to elevated triglyceride  Continue current mgt with insulin drip and  Gemfibrozil 600mg    Continue to monitor her blood gulcose  Continue to monitor triglyceride levels   Continue D5NS to keep Blood glucose > 300  Discuss with patient that if this does not improve she may need to be transferred for Apheresis  Repeat ct shows worsening and severe pancreatitis  GI input appreciated    Likely Sepsis  - pt is still tachycardic, with wbc now around 16.9, with severe pancreatitis and possible underlying infection- had a temp- 100.5 last night on rocephin & flagyl- bc NGTD  - continue supportive care  - continue abx      Tachycardia  continue ivfls. Decreased urine outpt-  We were unable to get novoa but urine outpt is good will monitor    Obesity   Weight loss advised    Borderline diabetic / prediabetic  Discuss the need to loose weight   Pt may benefit from metformin after dc to aid with weight loss      Type I hyperlipidemia? No current family history possible autosomal recessive   Defer to opt work up       DVT Prophylaxis: heparin  CODE Status:   Plan of Care Discussed with: patient. Care team.  Medical Risk: high- pt is high risk given her severe pancreatitis, probable sepsis, at increase risk for morbidity and mortality.    Disposition: pending      Dakota Lee MD  08/17/17

## 2017-08-17 NOTE — PROGRESS NOTES
Shift assessment complete. Pt alert and oriented. Respirations even and unlabored. Pt c/o abdominal pain. Dilaudid given slow IVP. Bowel sounds hypoactive. No other needs voiced at this time. Mother at bedside. All safety measures in place.

## 2017-08-17 NOTE — ADT AUTH CERT NOTES
Pancreatitis - Care Day 4 (8/17/2017) by David Rios RN        Review Status Review Entered       Completed 8/17/2017       Details              Care Day: 4 Care Date: 8/17/2017 Level of Care: Inpatient Floor       Guideline Day 2        Level Of Care       (X) Floor              Clinical Status       ( ) * Hemodynamic stability       8/17/2017 4:13 PM EDT by Mickey Sifuentes         , /70, SAT 97% RA              ( ) * Pain absent or reduced       8/17/2017 4:13 PM EDT by Mickey Sifuentes         9/10                     Activity       (X) Activity as tolerated              Routes       ( ) Possible oral hydration and medications       8/17/2017 4:13 PM EDT by Mickey Sifuentes         /HR, ROCEPHIN 1 GM IV QD, D50 50/HR,  PEPCID IV Q 12, LOPID PO, FLAGYL 500 MG IV Q 12, ZOFRAN IV, PERCOCET, PROTONIX IV QD, ALBUMIN IV,              ( ) Oral diet as tolerated       8/17/2017 4:13 PM EDT by Mickey Sifuentes         NPO                     Interventions       (X) * NG tube absent       (X) Laboratory tests       8/17/2017 4:13 PM EDT by Mickey Sifuentes         WBC: 16.8 (H) RBC: 3.59 (L) HGB: 9.4 (L) HCT: 30.3 (L)  Sodium: 133 (L) Glucose: 123 (H) Calcium: 7.9 (L) Albumin: 2.3 (L) Triglyceride: 415 (H)                     Medications       (X) Parenteral analgesia       8/17/2017 4:13 PM EDT by Mickey Sifuentes         DILAUDID IV                     8/17/2017 4:13 PM EDT by Mickey Sifuentes       Subject: Additional Clinical Information              Assessment:      Principal Problem:     Acute pancreatitis (8/14/2017)      Active Problems:     Leukocytosis (8/14/2017)       Nausea and vomiting (8/14/2017)         UTI (urinary tract infection) (8/14/2017)         GERD (gastroesophageal reflux disease) (8/14/2017)                      Plan:              32 y.o. female  with PMH of GERD and kidney stones admitted with sudden onset of generalized mid and lower abd pain.   Initial lipase [id number] with normal LFTs.   CT with enlarged liver and findings consistent with acute pancreatitis, worsened on repeat CT scan.   Gallbladder appeared normal.   She denies ETOH use or new medication.   Triglycerides were elevated on admission at 3,595 with total cholesterol of 482; started on Gemfibrozil with vast improvement in triglycerides.   Patient remains mildly but intermittently tachycardic but appears more comfortable today overall with less pain and distention.       1.   Continue aggressive hydration  2.   Continue Rocephin and Flagyl  3.   Supportive care with pain medication and anti-emetics  4.   NPO                                   * Milestone                  Pancreatitis - Care Day 3 (8/16/2017) by Erin Blizzard, RN        Review Status Review Entered       Completed 8/17/2017       Details              Care Day: 3 Care Date: 8/16/2017 Level of Care: Inpatient Floor       Guideline Day 2        Level Of Care       (X) Floor              Clinical Status       ( ) * Hemodynamic stability       8/17/2017 4:05 PM EDT by Jacquie An         Remains tachycardic 122, /84, SAT 95% RA              ( ) * Pain absent or reduced       8/17/2017 4:05 PM EDT by Jacquie An         no change in her pain overall  8/10                     Activity       (X) Activity as tolerated       8/17/2017 4:05 PM EDT by Jacquie An         AMB BR                     Routes       ( ) Possible oral hydration and medications       8/17/2017 4:05 PM EDT by Jacquie An         /HR, ROCEPHIN 1 GM IV QD, PEPCID IV Q 12, LOPID PO, FLAGYL 500 MG IV Q 12, ZOFRAN IV, PERCOCET, PROTONIX IV QD, ALBUMIN IV, CA GLUC IV, NS BOLUS                     Interventions       (X) * NG tube absent       (X) Laboratory tests       8/17/2017 4:05 PM EDT by Jacquie An         Bilirubin, total: 2.5 (H) Bilirubin, direct: 1.6 (H) Albumin: 2.0 (L) Globulin: 4.7 (H) A-G Ratio: 0.4 (L) ALT (SGPT): 12 AST: 14 (L) Alk.  phosphatase: 75 Sodium: 130 (L) Glucose: 147 (H) Calcium: 7.0 (L) Triglyceride: 403 (H)                     Medications       (X) Parenteral analgesia       8/17/2017 4:05 PM EDT by Steven Arboleda         DILAUDID IV X 7                     8/17/2017 4:05 PM EDT by Steven Arboleda       Subject: Additional Clinical Information       Assessment:      Principal Problem:     Acute pancreatitis (8/14/2017)      Active Problems:     Leukocytosis (8/14/2017)       Nausea and vomiting (8/14/2017)         UTI (urinary tract infection) (8/14/2017)         GERD (gastroesophageal reflux disease) (8/14/2017)                      Plan:              32 y.o. female  with PMH of GERD and kidney stones admitted with sudden onset of generalized mid and lower abd pain.   Initial lipase [id number] with normal LFTs.   CT with enlarged liver and findings consistent with acute pancreatitis.   Gallbladder appeared normal.   She denies ETOH use or new medication.   Triglycerides were elevated on admission at 3,595 with total cholesterol of 482; started on Gemfibrozil overnight with improvement in triglycerides.   Patient remains tachycardic but appears more comfortable today overall.       1.   Continue aggressive hydration  2.   Morning labs pending  3.   On IV Rocephin 1gm q 24hr  4.   Proceed with CT scan today to re-evaluate pancreas  5.   Supportive care with hydration, pain controlCT-  IMPRESSION:     1.   Findings once again consistent with acute pancreatitis. However, the degree  of inflammatory changes appear worsened with increasing peripancreatic  mesenteric edema, and increasing likely reactive fluid. Specifically, there is  increasing fluid in the right abdomen, and new fluid seen in the left abdomen,  and small bowel mesenteric leaflets, and in the pelvis. Peritoneal enhancement  is seen particularly in the right paracolic gutter and pelvis which can suggest  developing loculation.       2.  New trace right pleural effusion which is likely reactive to the worsening  right abdominal inflammatory changes described above.   WBC: 20.7 (H)         RBC: 3.94 (L)         HGB: 10.5 (L)         HCT: 33.2 (L)

## 2017-08-17 NOTE — PROGRESS NOTES
AM shift assessment , see flow shift .  Pt is c/o abdominal pain that was not well controlled over night and crying in pain this am . Call light in reach

## 2017-08-17 NOTE — PROGRESS NOTES
Pt requested pain medicine for 9/10 abdominal pain. Dilaudid 2mg given slow IVP. Will continue to monitor.

## 2017-08-18 LAB
ANION GAP SERPL CALC-SCNC: 8 MMOL/L (ref 7–16)
BASOPHILS # BLD: 0 K/UL (ref 0–0.2)
BASOPHILS NFR BLD: 0 % (ref 0–2)
BUN SERPL-MCNC: 4 MG/DL (ref 6–23)
CALCIUM SERPL-MCNC: 7.7 MG/DL (ref 8.3–10.4)
CHLORIDE SERPL-SCNC: 103 MMOL/L (ref 98–107)
CO2 SERPL-SCNC: 24 MMOL/L (ref 21–32)
CREAT SERPL-MCNC: 0.54 MG/DL (ref 0.6–1)
DIFFERENTIAL METHOD BLD: ABNORMAL
EOSINOPHIL # BLD: 0.1 K/UL (ref 0–0.8)
EOSINOPHIL NFR BLD: 1 % (ref 0.5–7.8)
ERYTHROCYTE [DISTWIDTH] IN BLOOD BY AUTOMATED COUNT: 15.5 % (ref 11.9–14.6)
GLUCOSE BLD STRIP.AUTO-MCNC: 102 MG/DL (ref 65–100)
GLUCOSE BLD STRIP.AUTO-MCNC: 115 MG/DL (ref 65–100)
GLUCOSE BLD STRIP.AUTO-MCNC: 116 MG/DL (ref 65–100)
GLUCOSE BLD STRIP.AUTO-MCNC: 120 MG/DL (ref 65–100)
GLUCOSE BLD STRIP.AUTO-MCNC: 86 MG/DL (ref 65–100)
GLUCOSE BLD STRIP.AUTO-MCNC: 88 MG/DL (ref 65–100)
GLUCOSE SERPL-MCNC: 90 MG/DL (ref 65–100)
HCT VFR BLD AUTO: 29.9 % (ref 35.8–46.3)
HGB BLD-MCNC: 9.4 G/DL (ref 11.7–15.4)
IMM GRANULOCYTES # BLD: 0.1 K/UL (ref 0–0.5)
IMM GRANULOCYTES NFR BLD: 0.8 % (ref 0–5)
LYMPHOCYTES # BLD: 1.6 K/UL (ref 0.5–4.6)
LYMPHOCYTES NFR BLD: 11 % (ref 13–44)
MCH RBC QN AUTO: 26.6 PG (ref 26.1–32.9)
MCHC RBC AUTO-ENTMCNC: 31.4 G/DL (ref 31.4–35)
MCV RBC AUTO: 84.5 FL (ref 79.6–97.8)
MONOCYTES # BLD: 1.4 K/UL (ref 0.1–1.3)
MONOCYTES NFR BLD: 10 % (ref 4–12)
NEUTS SEG # BLD: 11.7 K/UL (ref 1.7–8.2)
NEUTS SEG NFR BLD: 77 % (ref 43–78)
PLATELET # BLD AUTO: 158 K/UL (ref 150–450)
PMV BLD AUTO: 10.5 FL (ref 10.8–14.1)
POTASSIUM SERPL-SCNC: 3.5 MMOL/L (ref 3.5–5.1)
RBC # BLD AUTO: 3.54 M/UL (ref 4.05–5.25)
SODIUM SERPL-SCNC: 135 MMOL/L (ref 136–145)
TRIGL SERPL-MCNC: 409 MG/DL (ref 35–150)
WBC # BLD AUTO: 15 K/UL (ref 4.3–11.1)

## 2017-08-18 PROCEDURE — 74011250637 HC RX REV CODE- 250/637: Performed by: HOSPITALIST

## 2017-08-18 PROCEDURE — 94762 N-INVAS EAR/PLS OXIMTRY CONT: CPT

## 2017-08-18 PROCEDURE — 74011250636 HC RX REV CODE- 250/636: Performed by: INTERNAL MEDICINE

## 2017-08-18 PROCEDURE — 74011250636 HC RX REV CODE- 250/636: Performed by: HOSPITALIST

## 2017-08-18 PROCEDURE — 84478 ASSAY OF TRIGLYCERIDES: CPT | Performed by: INTERNAL MEDICINE

## 2017-08-18 PROCEDURE — 80048 BASIC METABOLIC PNL TOTAL CA: CPT | Performed by: INTERNAL MEDICINE

## 2017-08-18 PROCEDURE — C9113 INJ PANTOPRAZOLE SODIUM, VIA: HCPCS | Performed by: NURSE PRACTITIONER

## 2017-08-18 PROCEDURE — 82962 GLUCOSE BLOOD TEST: CPT

## 2017-08-18 PROCEDURE — 74011250636 HC RX REV CODE- 250/636: Performed by: NURSE PRACTITIONER

## 2017-08-18 PROCEDURE — 74011000258 HC RX REV CODE- 258: Performed by: HOSPITALIST

## 2017-08-18 PROCEDURE — 85025 COMPLETE CBC W/AUTO DIFF WBC: CPT | Performed by: INTERNAL MEDICINE

## 2017-08-18 PROCEDURE — 74011636637 HC RX REV CODE- 636/637: Performed by: HOSPITALIST

## 2017-08-18 PROCEDURE — 74011250637 HC RX REV CODE- 250/637: Performed by: NURSE PRACTITIONER

## 2017-08-18 PROCEDURE — 74011000250 HC RX REV CODE- 250: Performed by: HOSPITALIST

## 2017-08-18 PROCEDURE — 36415 COLL VENOUS BLD VENIPUNCTURE: CPT | Performed by: INTERNAL MEDICINE

## 2017-08-18 PROCEDURE — 74011000250 HC RX REV CODE- 250: Performed by: NURSE PRACTITIONER

## 2017-08-18 PROCEDURE — 94760 N-INVAS EAR/PLS OXIMETRY 1: CPT

## 2017-08-18 PROCEDURE — 65270000029 HC RM PRIVATE

## 2017-08-18 PROCEDURE — 74011000250 HC RX REV CODE- 250: Performed by: INTERNAL MEDICINE

## 2017-08-18 RX ADMIN — OXYCODONE HYDROCHLORIDE AND ACETAMINOPHEN 1 TABLET: 5; 325 TABLET ORAL at 17:22

## 2017-08-18 RX ADMIN — HYDROMORPHONE HYDROCHLORIDE 1 MG: 1 INJECTION, SOLUTION INTRAMUSCULAR; INTRAVENOUS; SUBCUTANEOUS at 15:18

## 2017-08-18 RX ADMIN — FAMOTIDINE 20 MG: 10 INJECTION, SOLUTION INTRAVENOUS at 21:50

## 2017-08-18 RX ADMIN — ONDANSETRON 4 MG: 2 INJECTION INTRAMUSCULAR; INTRAVENOUS at 19:50

## 2017-08-18 RX ADMIN — ONDANSETRON 4 MG: 2 INJECTION INTRAMUSCULAR; INTRAVENOUS at 01:03

## 2017-08-18 RX ADMIN — CEFTRIAXONE 1 G: 1 INJECTION, POWDER, FOR SOLUTION INTRAMUSCULAR; INTRAVENOUS at 23:09

## 2017-08-18 RX ADMIN — HYDROMORPHONE HYDROCHLORIDE 2 MG: 1 INJECTION, SOLUTION INTRAMUSCULAR; INTRAVENOUS; SUBCUTANEOUS at 00:54

## 2017-08-18 RX ADMIN — SODIUM CHLORIDE 2 UNITS/HR: 900 INJECTION, SOLUTION INTRAVENOUS at 02:52

## 2017-08-18 RX ADMIN — Medication 5 ML: at 21:50

## 2017-08-18 RX ADMIN — HYDROMORPHONE HYDROCHLORIDE 1 MG: 1 INJECTION, SOLUTION INTRAMUSCULAR; INTRAVENOUS; SUBCUTANEOUS at 19:50

## 2017-08-18 RX ADMIN — SODIUM CHLORIDE 150 ML/HR: 900 INJECTION, SOLUTION INTRAVENOUS at 15:30

## 2017-08-18 RX ADMIN — METRONIDAZOLE 500 MG: 500 INJECTION, SOLUTION INTRAVENOUS at 03:44

## 2017-08-18 RX ADMIN — SODIUM CHLORIDE 40 MG: 9 INJECTION INTRAMUSCULAR; INTRAVENOUS; SUBCUTANEOUS at 09:05

## 2017-08-18 RX ADMIN — BISACODYL 10 MG: 10 SUPPOSITORY RECTAL at 15:17

## 2017-08-18 RX ADMIN — GEMFIBROZIL 600 MG: 600 TABLET ORAL at 15:17

## 2017-08-18 RX ADMIN — SODIUM CHLORIDE 12.5 MG: 9 INJECTION INTRAMUSCULAR; INTRAVENOUS; SUBCUTANEOUS at 08:33

## 2017-08-18 RX ADMIN — OXYCODONE HYDROCHLORIDE AND ACETAMINOPHEN 1 TABLET: 5; 325 TABLET ORAL at 21:50

## 2017-08-18 RX ADMIN — ONDANSETRON 4 MG: 2 INJECTION INTRAMUSCULAR; INTRAVENOUS at 05:38

## 2017-08-18 RX ADMIN — FAMOTIDINE 20 MG: 10 INJECTION, SOLUTION INTRAVENOUS at 09:00

## 2017-08-18 RX ADMIN — SODIUM CHLORIDE 200 ML/HR: 900 INJECTION, SOLUTION INTRAVENOUS at 04:07

## 2017-08-18 RX ADMIN — HYDROMORPHONE HYDROCHLORIDE 1 MG: 1 INJECTION, SOLUTION INTRAMUSCULAR; INTRAVENOUS; SUBCUTANEOUS at 23:09

## 2017-08-18 RX ADMIN — HYDROMORPHONE HYDROCHLORIDE 2 MG: 1 INJECTION, SOLUTION INTRAMUSCULAR; INTRAVENOUS; SUBCUTANEOUS at 05:29

## 2017-08-18 RX ADMIN — SODIUM CHLORIDE 150 ML/HR: 900 INJECTION, SOLUTION INTRAVENOUS at 21:55

## 2017-08-18 RX ADMIN — HYDROMORPHONE HYDROCHLORIDE 1 MG: 1 INJECTION, SOLUTION INTRAMUSCULAR; INTRAVENOUS; SUBCUTANEOUS at 11:56

## 2017-08-18 RX ADMIN — HYDROMORPHONE HYDROCHLORIDE 2 MG: 1 INJECTION, SOLUTION INTRAMUSCULAR; INTRAVENOUS; SUBCUTANEOUS at 08:24

## 2017-08-18 RX ADMIN — METRONIDAZOLE 500 MG: 500 INJECTION, SOLUTION INTRAVENOUS at 15:18

## 2017-08-18 RX ADMIN — OXYCODONE HYDROCHLORIDE AND ACETAMINOPHEN 1 TABLET: 5; 325 TABLET ORAL at 03:47

## 2017-08-18 RX ADMIN — ONDANSETRON 4 MG: 2 INJECTION INTRAMUSCULAR; INTRAVENOUS at 15:30

## 2017-08-18 RX ADMIN — GEMFIBROZIL 600 MG: 600 TABLET ORAL at 09:00

## 2017-08-18 RX ADMIN — Medication 10 ML: at 05:41

## 2017-08-18 RX ADMIN — DOCUSATE SODIUM 100 MG: 100 CAPSULE, LIQUID FILLED ORAL at 09:00

## 2017-08-18 RX ADMIN — OXYCODONE HYDROCHLORIDE AND ACETAMINOPHEN 1 TABLET: 5; 325 TABLET ORAL at 10:58

## 2017-08-18 NOTE — PROGRESS NOTES
GI DAILY PROGRESS NOTE    Admit Date:  8/13/2017    Today's Date:  8/18/2017    CC:  Pancreatitis, elevated triglycerides    Subjective:     Patient again reports a rough night with pain increasing as she gets up to urinate. Had a stool last night that did relieve some of her abdominal pain but does not feel like she has completely emptied and requests another supppository. Remains NPO. Mother at bedside.       Medications:   Current Facility-Administered Medications   Medication Dose Route Frequency    metroNIDAZOLE (FLAGYL) IVPB premix 500 mg  500 mg IntraVENous Q12H    dextrose 5% infusion  50 mL/hr IntraVENous CONTINUOUS    bisacodyl (DULCOLAX) suppository 10 mg  10 mg Rectal DAILY PRN    pantoprazole (PROTONIX) 40 mg in sodium chloride 0.9 % 10 mL injection  40 mg IntraVENous DAILY    HYDROmorphone (PF) (DILAUDID) injection 2 mg  2 mg IntraVENous Q3H PRN    HYDROmorphone (PF) (DILAUDID) injection 1 mg  1 mg IntraVENous Q3H PRN    sodium chloride (NS) flush 5-10 mL  5-10 mL IntraVENous Q8H    sodium chloride (NS) flush 5-10 mL  5-10 mL IntraVENous PRN    0.9% sodium chloride infusion  200 mL/hr IntraVENous CONTINUOUS    acetaminophen (TYLENOL) tablet 650 mg  650 mg Oral Q4H PRN    oxyCODONE-acetaminophen (PERCOCET) 5-325 mg per tablet 1 Tab  1 Tab Oral Q4H PRN    naloxone (NARCAN) injection 0.4 mg  0.4 mg IntraVENous PRN    ondansetron (ZOFRAN) injection 4 mg  4 mg IntraVENous Q4H PRN    cefTRIAXone (ROCEPHIN) 1 g in 0.9% sodium chloride (MBP/ADV) 50 mL  1 g IntraVENous Q24H    famotidine (PF) (PEPCID) 20 mg in sodium chloride 0.9 % 10 mL injection  20 mg IntraVENous Q12H    docusate sodium (COLACE) capsule 100 mg  100 mg Oral DAILY    bisacodyl (DULCOLAX) tablet 5 mg  5 mg Oral DAILY PRN    Or    bisacodyl (DULCOLAX) suppository 10 mg  10 mg Rectal DAILY PRN    Or    magnesium hydroxide (MILK OF MAGNESIA) 400 mg/5 mL oral suspension 30 mL  30 mL Oral DAILY PRN    promethazine (PHENERGAN) with saline injection 12.5 mg  12.5 mg IntraVENous Q6H PRN    gemfibrozil (LOPID) tablet 600 mg  600 mg Oral ACB&D    insulin lispro (HUMALOG) injection   SubCUTAneous Q6H    dextrose 40% (GLUTOSE) oral gel 1 Tube  15 g Oral PRN    glucagon (GLUCAGEN) injection 1 mg  1 mg IntraMUSCular PRN    dextrose (D50W) injection syrg 12.5-25 g  25-50 mL IntraVENous PRN    insulin regular (NOVOLIN R, HUMULIN R) 100 Units in 0.9% sodium chloride 100 mL infusion  1-10 Units/hr IntraVENous CONTINUOUS       Review of Systems:  ROS was obtained, with pertinent positives as listed above. No chest pain or SOB. Diet:  npo    Objective:   Vitals:  Visit Vitals    /69 (BP 1 Location: Right arm, BP Patient Position: At rest)    Pulse (!) 103    Temp 97.4 °F (36.3 °C)    Resp 18    Ht 5' 4\" (1.626 m)    Wt 104.5 kg (230 lb 6.4 oz)    LMP 07/10/2017    SpO2 98%    Breastfeeding No    BMI 39.55 kg/m2     Intake/Output:     08/16 1901 - 08/18 0700  In: 2800 [I.V.:2800]  Out: 3800 [Urine:3400]  Exam:  General appearance: alert, cooperative, no distress  Lungs: clear to auscultation bilaterally anteriorly  Heart: regular rate and rhythm, only mild tachycardia at present  Abdomen: soft, full, epigastric tenderness to exam but improving. Active bowel sounds.  No masses, no organomegaly  Extremities: extremities normal, atraumatic, no cyanosis or edema  Neuro:  alert and oriented    Data Review (Labs):    Recent Labs      08/18/17   0522  08/17/17   1118  08/16/17   0950  08/16/17   0918   WBC  15.0*  16.8*   --   20.7*   HGB  9.4*  9.4*   --   10.5*   HCT  29.9*  30.3*   --   33.2*   PLT  158  160   --   165   MCV  84.5  84.4   --   84.3   NA  135*  133*  130*   --    K  3.5  3.7  3.8   --    CL  103  102  99   --    CO2  24  25  19*   --    BUN  4*  4*  4*   --    CREA  0.54*  0.62  0.78   --    CA  7.7*  7.9*  7.0*   --    MG   --    --   2.1   --    GLU  90  123*  147*   --    AP   --    --    --   75   SGOT   -- --    --   14*   ALT   --    --    --   12   TBILI   --    --    --   2.5*   CBIL   --    --    --   1.6*   ALB   --   2.3*   --   2.0*   TP   --    --    --   6.7       Assessment:     Principal Problem:    Acute pancreatitis (8/14/2017)    Active Problems:    Leukocytosis (8/14/2017)      Nausea and vomiting (8/14/2017)      UTI (urinary tract infection) (8/14/2017)      GERD (gastroesophageal reflux disease) (8/14/2017)        Plan:     27 y.o. female with PMH of GERD and kidney stones admitted with sudden onset of generalized mid and lower abd pain.  Initial lipase [id number] with normal LFTs.  CT with enlarged liver and findings consistent with acute pancreatitis, worsened on repeat CT scan.  Gallbladder appeared normal.  She denies ETOH use or new medication. Triglycerides were elevated on admission at 3,595 with total cholesterol of 482; started on Gemfibrozil with vast improvement in triglycerides. Patient remains mildly but intermittently tachycardic but appears more comfortable day by day overall with less pain and distention. WBC improving and afebrile for the last 24 hours. 1.  Dulcolax supp for constipation  2. Continue Rocephin and Flagyl as ordered, WBC improving  3. Supportive care with pain medication and anti-emetics; may be able to decrease IV rate today  4. NPO; may need nj feeds for nutrition if not able to start diet soon    Patient is seen and examined in collaboration with Dr. Connor Magdaleno. Assessment and plan as per Dr. Connor Magdaleno.   Andrea Austin NP

## 2017-08-18 NOTE — PROGRESS NOTES
Shift assessment complete.  Pt alert and oriented x4, respirations present, even and unlabored, HOB elevated, pt denies any SOB at this time, S1&S2 auscultated, HR regular, abd soft, tender, hypoactive BS in all 4 quadrants, No pressure ulcers or edema noted, pt is up ad candelaria to the bathroom, voiding, SCDs in place and functioning, IVF infusing without difficulty, pt states moderate pain at this time, pt instructed to call for assistance, pt verbalizes understanding, bed low and locked, side rails x3, call light within reach.

## 2017-08-18 NOTE — PROGRESS NOTES
Problem: Nutrition Deficit  Goal: *Optimize nutritional status  Nutrition:  Reason for assessment: Length of stay day 4 (8/18)   Assessment:   Diet order(s): 8/18: Clear liquids; 8/14-8/18: NPO  Food/Nutrition Patient History:  Pt presented with mid-lower abdominal pain, n/v; admitted with acute pancreatitis. Pt endorses that she does not eat a healthy diet at home but knows what consists of a healthy diet; pt eats mainly canned, boxed or convenience frozen foods. Pt does not eat fish, chicken or turkey; eats beef, pork and moore, lots of cheese, dried peas and beans, some yogurt; minimal sweetened soft drinks and tea (1 X week); main beverage is water. Pt lives with her mother and brother; states lots of junk food in the house. Pertinent Labs:  Elevated lipase: 550 U/L (8/15), on admission- lipase 1793 U/L. Elevated triglyceride: 409- Triglyceride on admission 3595. Anthropometrics:Height: 5' 4\" (162.6 cm),  Weight: 104.5 kg (230 lb 6.4 oz), Weight Source: Bed, Body mass index is 39.55 kg/(m^2). BMI class ofObesity Class 11 . Macronutrient needs:  EER:  2648-7920 kcal /day (13-18 kcal/kg BW)  EPR:  ~82 grams protein/day (1.5 grams/kg IBW) r/t acute pancreatitis  Intake/Comparative Standards: Per RD meal rounds: Clear liquid lunch; pt took sips/bites of broth, jello and popsicle; tolerating liquids. Clear liquid diet does not meet estimated kcal or protein needs. Nutrition Diagnosis: Inadequate oral intake r/t decreased ability to consume sufficient oral intake as evidenced by NPO status past 4 days. Intervention:  1. Meals and snacks: Continue current diet; advance per MD recommendation. Briefly discussed dietary guidelines for a low fat diet. Encouraged patient to start on an exercise regime past discharge if approved by MD.  2.  Nutrition Supplement Therapy: Initiated Ensure Clear on all meal trays. 3.  Coordination of Nutrition Care:  Interdisciplinary   4.   Nutrition Discharge Plans:  Low fat diet or diet recommended by MD.     Ute Vega, [address] [place] [address], LD, MPH  [phone]

## 2017-08-18 NOTE — PROGRESS NOTES
AM shift assessment , see flow shift . Lungs clear and respirations even and unlabored. Abdomen is semi - soft and tender to touch , Bowel sounds active the patient had BM 8/17/17. pt is on her menstrual cycle . all 3 iv patent and 2 infusing the patient refuses scds.  Call light in reach

## 2017-08-18 NOTE — PROGRESS NOTES
Hospitalist Progress Note    2017  Admit Date: 2017  7:45 PM   NAME: Jaleel Casas   :  1990   DOS:              17  MRN:  [id number]   Attending: Adrian Tristan MD  PCP:  None  Treatment Team: Attending Provider: Adryan Alvarenga MD; Consulting Provider: Evgeny Davila MD; Utilization Review: Pat Tenorio RN    Full Code     SUBJECTIVE:   As previously documented:       08/15/2017    Jaleel Casas 27 yr female admitted for acute pancreatitis secondary to hypertriglyceridemia. Initially > 3000 now around 1000. Today pt is tachycardic and complaining of severs abdominal pain and decreased urine ouput. Seen by GI who have increased her pain meds, ivfls and are getting a repeat ct     Plasmapharesis was previously discussed with her- she was not agreeable. She now is if it is needed. 2017- pt seen - she still feels very ill- also very concerned because she has not passed gas or had bm. She is on stool softners. ct abdomen shows worsening changes c/w severe pancreatitis. She continues to be tachycardic with severe pain. 2017- seen - pain better- constipated- needs work note    10+ ROS reviewed and negative except for positive in HPI.    No Known Allergies  Current Facility-Administered Medications   Medication Dose Route Frequency    metroNIDAZOLE (FLAGYL) IVPB premix 500 mg  500 mg IntraVENous Q12H    dextrose 5% infusion  25 mL/hr IntraVENous CONTINUOUS    bisacodyl (DULCOLAX) suppository 10 mg  10 mg Rectal DAILY PRN    pantoprazole (PROTONIX) 40 mg in sodium chloride 0.9 % 10 mL injection  40 mg IntraVENous DAILY    HYDROmorphone (PF) (DILAUDID) injection 2 mg  2 mg IntraVENous Q3H PRN    HYDROmorphone (PF) (DILAUDID) injection 1 mg  1 mg IntraVENous Q3H PRN    sodium chloride (NS) flush 5-10 mL  5-10 mL IntraVENous Q8H    sodium chloride (NS) flush 5-10 mL  5-10 mL IntraVENous PRN    0.9% sodium chloride infusion  150 mL/hr IntraVENous CONTINUOUS    acetaminophen (TYLENOL) tablet 650 mg  650 mg Oral Q4H PRN    oxyCODONE-acetaminophen (PERCOCET) 5-325 mg per tablet 1 Tab  1 Tab Oral Q4H PRN    naloxone (NARCAN) injection 0.4 mg  0.4 mg IntraVENous PRN    ondansetron (ZOFRAN) injection 4 mg  4 mg IntraVENous Q4H PRN    cefTRIAXone (ROCEPHIN) 1 g in 0.9% sodium chloride (MBP/ADV) 50 mL  1 g IntraVENous Q24H    famotidine (PF) (PEPCID) 20 mg in sodium chloride 0.9 % 10 mL injection  20 mg IntraVENous Q12H    docusate sodium (COLACE) capsule 100 mg  100 mg Oral DAILY    bisacodyl (DULCOLAX) tablet 5 mg  5 mg Oral DAILY PRN    Or    magnesium hydroxide (MILK OF MAGNESIA) 400 mg/5 mL oral suspension 30 mL  30 mL Oral DAILY PRN    promethazine (PHENERGAN) with saline injection 12.5 mg  12.5 mg IntraVENous Q6H PRN    gemfibrozil (LOPID) tablet 600 mg  600 mg Oral ACB&D    insulin lispro (HUMALOG) injection   SubCUTAneous Q6H    dextrose 40% (GLUTOSE) oral gel 1 Tube  15 g Oral PRN    glucagon (GLUCAGEN) injection 1 mg  1 mg IntraMUSCular PRN    dextrose (D50W) injection syrg 12.5-25 g  25-50 mL IntraVENous PRN    insulin regular (NOVOLIN R, HUMULIN R) 100 Units in 0.9% sodium chloride 100 mL infusion  1-10 Units/hr IntraVENous CONTINUOUS         Immunization History   Administered Date(s) Administered    Influenza Vaccine 2014     Objective:     Patient Vitals for the past 24 hrs:   Temp Pulse Resp BP SpO2   17 1400 (!) 100.5 °F (38.1 °C) (!) 116 22 134/76 98 %   17 1000 99.5 °F (37.5 °C) (!) 106 20 129/85 96 %   17 0854 - - - - 96 %   17 0815 98.2 °F (36.8 °C) (!) 103 18 122/80 96 %   17 0347 97.4 °F (36.3 °C) (!) 103 18 129/69 98 %   17 2254 99.6 °F (37.6 °C) (!) 115 20 143/84 98 %   17 - - - - 97 %   17 1825 99 °F (37.2 °C) (!) 109 18 128/65 99 %     Temp (24hrs), Av °F (37.2 °C), Min:97.4 °F (36.3 °C), Max:100.5 °F (38.1 °C)    Oxygen Therapy  O2 Sat (%): 98 % (17 1400)  Pulse via Oximetry: 110 beats per minute (08/18/17 0854)  O2 Device: Room air (08/18/17 0854)  O2 Flow Rate (L/min): 0 l/min (08/18/17 0854)  Oxygen Therapy  O2 Sat (%): 98 % (08/18/17 1400)  Pulse via Oximetry: 110 beats per minute (08/18/17 0854)  O2 Device: Room air (08/18/17 0854)  O2 Flow Rate (L/min): 0 l/min (08/18/17 0854)    Physical Exam:  General:         Alert, cooperative, no distress   HEENT:               NCAT. No obvious deformity. Nares normal. No drainage  Lungs:  CTABL. No wheezing/rhonchi/rales  Cardiovascular:   RRR. No m/r/g. No pedal edema b/l. +2 PT/DT pulses b/l. Abdomen:       S/nt/ mild tenderness hypotonic   Bowel sounds normal. .   Skin:         No rashes or lesions. Not Jaundiced  Neurologic:    AAOx3. CN II- XII grossly WNL. No gross focal deficit. Moves all extremities. Normal sensory. Normal gait. Psychiatric:         Good mood. Normal affect. Denies any SI/HI.                DIAGNOSTIC STUDIES      Data Review:   Recent Results (from the past 24 hour(s))   GLUCOSE, POC    Collection Time: 08/17/17  6:30 PM   Result Value Ref Range    Glucose (POC) 96 65 - 100 mg/dL   GLUCOSE, POC    Collection Time: 08/17/17  9:49 PM   Result Value Ref Range    Glucose (POC) 87 65 - 100 mg/dL   GLUCOSE, POC    Collection Time: 08/18/17  2:51 AM   Result Value Ref Range    Glucose (POC) 102 (H) 65 - 100 mg/dL   CBC WITH AUTOMATED DIFF    Collection Time: 08/18/17  5:22 AM   Result Value Ref Range    WBC 15.0 (H) 4.3 - 11.1 K/uL    RBC 3.54 (L) 4.05 - 5.25 M/uL    HGB 9.4 (L) 11.7 - 15.4 g/dL    HCT 29.9 (L) 35.8 - 46.3 %    MCV 84.5 79.6 - 97.8 FL    MCH 26.6 26.1 - 32.9 PG    MCHC 31.4 31.4 - 35.0 g/dL    RDW 15.5 (H) 11.9 - 14.6 %    PLATELET 731 874 - 739 K/uL    MPV 10.5 (L) 10.8 - 14.1 FL    DF AUTOMATED      NEUTROPHILS 77 43 - 78 %    LYMPHOCYTES 11 (L) 13 - 44 %    MONOCYTES 10 4.0 - 12.0 %    EOSINOPHILS 1 0.5 - 7.8 %    BASOPHILS 0 0.0 - 2.0 %    IMMATURE GRANULOCYTES 0.8 0.0 - 5.0 %    ABS. NEUTROPHILS 11.7 (H) 1.7 - 8.2 K/UL    ABS. LYMPHOCYTES 1.6 0.5 - 4.6 K/UL    ABS. MONOCYTES 1.4 (H) 0.1 - 1.3 K/UL    ABS. EOSINOPHILS 0.1 0.0 - 0.8 K/UL    ABS. BASOPHILS 0.0 0.0 - 0.2 K/UL    ABS. IMM. GRANS. 0.1 0.0 - 0.5 K/UL   METABOLIC PANEL, BASIC    Collection Time: 08/18/17  5:22 AM   Result Value Ref Range    Sodium 135 (L) 136 - 145 mmol/L    Potassium 3.5 3.5 - 5.1 mmol/L    Chloride 103 98 - 107 mmol/L    CO2 24 21 - 32 mmol/L    Anion gap 8 7 - 16 mmol/L    Glucose 90 65 - 100 mg/dL    BUN 4 (L) 6 - 23 MG/DL    Creatinine 0.54 (L) 0.6 - 1.0 MG/DL    GFR est AA >60 >60 ml/min/1.73m2    GFR est non-AA >60 >60 ml/min/1.73m2    Calcium 7.7 (L) 8.3 - 10.4 MG/DL   TRIGLYCERIDE    Collection Time: 08/18/17  5:22 AM   Result Value Ref Range    Triglyceride 409 (H) 35 - 150 MG/DL   GLUCOSE, POC    Collection Time: 08/18/17  5:57 AM   Result Value Ref Range    Glucose (POC) 88 65 - 100 mg/dL   GLUCOSE, POC    Collection Time: 08/18/17 10:00 AM   Result Value Ref Range    Glucose (POC) 86 65 - 100 mg/dL   GLUCOSE, POC    Collection Time: 08/18/17  2:05 PM   Result Value Ref Range    Glucose (POC) 116 (H) 65 - 100 mg/dL       All Micro Results     Procedure Component Value Units Date/Time    CULTURE, URINE [[id number]] Collected:  08/13/17 2005    Order Status:  Completed Specimen:  Urine from Clean catch Updated:  08/16/17 0702     Special Requests: NO SPECIAL REQUESTS        Culture result:         10,000 to 50,000 COLONIES/mL MIXED SKIN KENNETH ISOLATED          Imaging /Procedures /Studies:    CXR Results  (Last 48 hours)    None        CT Results  (Last 48 hours)    None        No results found. No results found for this visit on 08/13/17.     Labs and Studies from previous 24 hours have been personally reviewed by myself Adolfo Problems    Diagnosis Date Noted    Acute pancreatitis 08/14/2017    Leukocytosis 08/14/2017    Nausea and vomiting 08/14/2017    UTI (urinary tract infection) 08/14/2017    GERD (gastroesophageal reflux disease) 08/14/2017     Hospital Problems as of 8/18/2017  Never Reviewed          Codes Class Noted - Resolved POA    * (Principal)Acute pancreatitis ICD-10-CM: K85.90  ICD-9-CM: 267.2  8/14/2017 - Present Yes        Leukocytosis ICD-10-CM: D72.829  ICD-9-CM: 288.60  8/14/2017 - Present Yes        Nausea and vomiting ICD-10-CM: R11.2  ICD-9-CM: 787.01  8/14/2017 - Present Yes        UTI (urinary tract infection) ICD-10-CM: N39.0  ICD-9-CM: 599.0  8/14/2017 - Present Yes        GERD (gastroesophageal reflux disease) ICD-10-CM: K21.9  ICD-9-CM: 530.81  8/14/2017 - Present Yes              Plan:    Pancreatitis    Due to elevated triglyceride  Continue current mgt with insulin drip and  Gemfibrozil 600mg    Continue to monitor her blood  Glucose    Continue to monitor triglyceride levels   Continue D5W  Discuss with patient that if this does not improve she may need to be transferred for Apheresis  Repeat ct showed worsening and severe pancreatitis  GI input appreciated    Likely Sepsis  - pt is tachycardic, with wbc > 15 fever - 100.5 , tachycardic, with severe pancreatitis and possible underlying infection- already on bs abx will follow   - continue supportive care  - continue abx      Tachycardia  continue ivfls. check lactic acid and procal  And bc for any sepsis  GI has added flagyl to to her rocephin    Decreased urine outpt-  unable to get novoa has a bedside commode- urine outpt is good but urine is dark    Obesity   Weight loss advised    Borderline diabetic / prediabetic  Discuss the need to loose weight   Pt may benefit from metformin after dc to aid with weight loss      Type I hyperlipidemia? No current family history possible autosomal recessive   Defer to opt work up       DVT Prophylaxis: heparin  CODE Status:   Plan of Care Discussed with: patient.  Care team.  Medical Risk: high- pt is high risk given her severe pancreatitis, probable sepsis, at increase risk for morbidity and mortality.    Disposition: pending      Amirah Umana MD  08/18/17

## 2017-08-18 NOTE — PROGRESS NOTES
Pt has tolerated clear liquid diet , no nausea or vomiting or no further increase in baseline pain that she has been experiencing

## 2017-08-18 NOTE — CDMP QUERY
Please clarify if this patient is being treated/managed for:    HYPONATREMIA in the setting of acute pancreatitis and sepsis with Na 130 being treated with IVF    =>Other Explanation of clinical findings  =>Unable to Determine (no explanation of clinical findings)    The medical record reflects the following:    Risk Factors: Acute pancreatitis, NPO    Clinical Indicators: Na 130    Treatment: IVF    Please clarify and document your clinical opinion in the progress notes and discharge summary including the definitive and/or presumptive diagnosis, (suspected or probable), related to the above clinical findings. Please include clinical findings supporting your diagnosis.     Thanks,  Nikki Tapia RN, CDS  Compliant Documentation Management Program  [phone]

## 2017-08-19 LAB
ANION GAP SERPL CALC-SCNC: 12 MMOL/L (ref 7–16)
BASOPHILS # BLD: 0 K/UL (ref 0–0.2)
BASOPHILS NFR BLD: 0 % (ref 0–2)
BUN SERPL-MCNC: 3 MG/DL (ref 6–23)
CALCIUM SERPL-MCNC: 8.4 MG/DL (ref 8.3–10.4)
CHLORIDE SERPL-SCNC: 102 MMOL/L (ref 98–107)
CO2 SERPL-SCNC: 23 MMOL/L (ref 21–32)
CREAT SERPL-MCNC: 0.58 MG/DL (ref 0.6–1)
DIFFERENTIAL METHOD BLD: ABNORMAL
EOSINOPHIL # BLD: 0.1 K/UL (ref 0–0.8)
EOSINOPHIL NFR BLD: 1 % (ref 0.5–7.8)
ERYTHROCYTE [DISTWIDTH] IN BLOOD BY AUTOMATED COUNT: 15.2 % (ref 11.9–14.6)
GLUCOSE BLD STRIP.AUTO-MCNC: 110 MG/DL (ref 65–100)
GLUCOSE BLD STRIP.AUTO-MCNC: 122 MG/DL (ref 65–100)
GLUCOSE BLD STRIP.AUTO-MCNC: 155 MG/DL (ref 65–100)
GLUCOSE BLD STRIP.AUTO-MCNC: 95 MG/DL (ref 65–100)
GLUCOSE BLD STRIP.AUTO-MCNC: 98 MG/DL (ref 65–100)
GLUCOSE SERPL-MCNC: 120 MG/DL (ref 65–100)
HCT VFR BLD AUTO: 30.4 % (ref 35.8–46.3)
HGB BLD-MCNC: 9.7 G/DL (ref 11.7–15.4)
IMM GRANULOCYTES # BLD: 0.2 K/UL (ref 0–0.5)
IMM GRANULOCYTES NFR BLD: 1.3 % (ref 0–5)
LYMPHOCYTES # BLD: 1.6 K/UL (ref 0.5–4.6)
LYMPHOCYTES NFR BLD: 11 % (ref 13–44)
MCH RBC QN AUTO: 26.5 PG (ref 26.1–32.9)
MCHC RBC AUTO-ENTMCNC: 31.9 G/DL (ref 31.4–35)
MCV RBC AUTO: 83.1 FL (ref 79.6–97.8)
MONOCYTES # BLD: 1.3 K/UL (ref 0.1–1.3)
MONOCYTES NFR BLD: 10 % (ref 4–12)
NEUTS SEG # BLD: 10.6 K/UL (ref 1.7–8.2)
NEUTS SEG NFR BLD: 77 % (ref 43–78)
PLATELET # BLD AUTO: 187 K/UL (ref 150–450)
PMV BLD AUTO: 10.3 FL (ref 10.8–14.1)
POTASSIUM SERPL-SCNC: 3.2 MMOL/L (ref 3.5–5.1)
RBC # BLD AUTO: 3.66 M/UL (ref 4.05–5.25)
SODIUM SERPL-SCNC: 137 MMOL/L (ref 136–145)
TRIGL SERPL-MCNC: 517 MG/DL (ref 35–150)
WBC # BLD AUTO: 13.8 K/UL (ref 4.3–11.1)

## 2017-08-19 PROCEDURE — C9113 INJ PANTOPRAZOLE SODIUM, VIA: HCPCS | Performed by: NURSE PRACTITIONER

## 2017-08-19 PROCEDURE — 36415 COLL VENOUS BLD VENIPUNCTURE: CPT | Performed by: INTERNAL MEDICINE

## 2017-08-19 PROCEDURE — 65270000029 HC RM PRIVATE

## 2017-08-19 PROCEDURE — 85025 COMPLETE CBC W/AUTO DIFF WBC: CPT | Performed by: INTERNAL MEDICINE

## 2017-08-19 PROCEDURE — 74011250636 HC RX REV CODE- 250/636: Performed by: NURSE PRACTITIONER

## 2017-08-19 PROCEDURE — 94760 N-INVAS EAR/PLS OXIMETRY 1: CPT

## 2017-08-19 PROCEDURE — 74011000250 HC RX REV CODE- 250: Performed by: INTERNAL MEDICINE

## 2017-08-19 PROCEDURE — 82962 GLUCOSE BLOOD TEST: CPT

## 2017-08-19 PROCEDURE — 74011000250 HC RX REV CODE- 250: Performed by: HOSPITALIST

## 2017-08-19 PROCEDURE — 84478 ASSAY OF TRIGLYCERIDES: CPT | Performed by: INTERNAL MEDICINE

## 2017-08-19 PROCEDURE — 74011250636 HC RX REV CODE- 250/636: Performed by: HOSPITALIST

## 2017-08-19 PROCEDURE — 74011250637 HC RX REV CODE- 250/637: Performed by: HOSPITALIST

## 2017-08-19 PROCEDURE — 74011000250 HC RX REV CODE- 250: Performed by: NURSE PRACTITIONER

## 2017-08-19 PROCEDURE — 80048 BASIC METABOLIC PNL TOTAL CA: CPT | Performed by: INTERNAL MEDICINE

## 2017-08-19 PROCEDURE — 74011000258 HC RX REV CODE- 258: Performed by: HOSPITALIST

## 2017-08-19 RX ADMIN — METRONIDAZOLE 500 MG: 500 INJECTION, SOLUTION INTRAVENOUS at 04:55

## 2017-08-19 RX ADMIN — Medication 10 ML: at 13:34

## 2017-08-19 RX ADMIN — ONDANSETRON 4 MG: 2 INJECTION INTRAMUSCULAR; INTRAVENOUS at 09:47

## 2017-08-19 RX ADMIN — OXYCODONE HYDROCHLORIDE AND ACETAMINOPHEN 1 TABLET: 5; 325 TABLET ORAL at 16:46

## 2017-08-19 RX ADMIN — METRONIDAZOLE 500 MG: 500 INJECTION, SOLUTION INTRAVENOUS at 16:49

## 2017-08-19 RX ADMIN — OXYCODONE HYDROCHLORIDE AND ACETAMINOPHEN 1 TABLET: 5; 325 TABLET ORAL at 02:26

## 2017-08-19 RX ADMIN — DOCUSATE SODIUM 100 MG: 100 CAPSULE, LIQUID FILLED ORAL at 09:46

## 2017-08-19 RX ADMIN — HYDROMORPHONE HYDROCHLORIDE 1 MG: 1 INJECTION, SOLUTION INTRAMUSCULAR; INTRAVENOUS; SUBCUTANEOUS at 13:34

## 2017-08-19 RX ADMIN — SODIUM CHLORIDE 150 ML/HR: 900 INJECTION, SOLUTION INTRAVENOUS at 06:32

## 2017-08-19 RX ADMIN — SODIUM CHLORIDE 40 MG: 9 INJECTION INTRAMUSCULAR; INTRAVENOUS; SUBCUTANEOUS at 09:45

## 2017-08-19 RX ADMIN — Medication 5 ML: at 05:07

## 2017-08-19 RX ADMIN — OXYCODONE HYDROCHLORIDE AND ACETAMINOPHEN 1 TABLET: 5; 325 TABLET ORAL at 21:28

## 2017-08-19 RX ADMIN — GEMFIBROZIL 600 MG: 600 TABLET ORAL at 09:46

## 2017-08-19 RX ADMIN — OXYCODONE HYDROCHLORIDE AND ACETAMINOPHEN 1 TABLET: 5; 325 TABLET ORAL at 09:46

## 2017-08-19 RX ADMIN — FAMOTIDINE 20 MG: 10 INJECTION, SOLUTION INTRAVENOUS at 09:47

## 2017-08-19 RX ADMIN — SODIUM CHLORIDE 150 ML/HR: 900 INJECTION, SOLUTION INTRAVENOUS at 18:19

## 2017-08-19 RX ADMIN — GEMFIBROZIL 600 MG: 600 TABLET ORAL at 16:47

## 2017-08-19 RX ADMIN — Medication 10 ML: at 21:35

## 2017-08-19 RX ADMIN — CEFTRIAXONE 1 G: 1 INJECTION, POWDER, FOR SOLUTION INTRAMUSCULAR; INTRAVENOUS at 23:44

## 2017-08-19 RX ADMIN — FAMOTIDINE 20 MG: 10 INJECTION, SOLUTION INTRAVENOUS at 21:28

## 2017-08-19 RX ADMIN — HYDROMORPHONE HYDROCHLORIDE 1 MG: 1 INJECTION, SOLUTION INTRAMUSCULAR; INTRAVENOUS; SUBCUTANEOUS at 06:32

## 2017-08-19 RX ADMIN — ONDANSETRON 4 MG: 2 INJECTION INTRAMUSCULAR; INTRAVENOUS at 21:53

## 2017-08-19 RX ADMIN — SODIUM CHLORIDE 150 ML/HR: 900 INJECTION, SOLUTION INTRAVENOUS at 22:34

## 2017-08-19 NOTE — PROGRESS NOTES
.  [facility] Consult Note             Date: 8/19/2017    GI Problem: Acute pancreatitis    History of Present Illness:  Patient is a 27 y.o. female who is seen in consultation for acute pancreatitis due to TG's. Today she tells me she feels 60-70% better. She says she is not ready for solid food yet though.      Hospital Medications:  Current Facility-Administered Medications   Medication Dose Route Frequency    metroNIDAZOLE (FLAGYL) IVPB premix 500 mg  500 mg IntraVENous Q12H    dextrose 5% infusion  25 mL/hr IntraVENous CONTINUOUS    bisacodyl (DULCOLAX) suppository 10 mg  10 mg Rectal DAILY PRN    pantoprazole (PROTONIX) 40 mg in sodium chloride 0.9 % 10 mL injection  40 mg IntraVENous DAILY    HYDROmorphone (PF) (DILAUDID) injection 2 mg  2 mg IntraVENous Q3H PRN    HYDROmorphone (PF) (DILAUDID) injection 1 mg  1 mg IntraVENous Q3H PRN    sodium chloride (NS) flush 5-10 mL  5-10 mL IntraVENous Q8H    sodium chloride (NS) flush 5-10 mL  5-10 mL IntraVENous PRN    0.9% sodium chloride infusion  150 mL/hr IntraVENous CONTINUOUS    acetaminophen (TYLENOL) tablet 650 mg  650 mg Oral Q4H PRN    oxyCODONE-acetaminophen (PERCOCET) 5-325 mg per tablet 1 Tab  1 Tab Oral Q4H PRN    naloxone (NARCAN) injection 0.4 mg  0.4 mg IntraVENous PRN    ondansetron (ZOFRAN) injection 4 mg  4 mg IntraVENous Q4H PRN    cefTRIAXone (ROCEPHIN) 1 g in 0.9% sodium chloride (MBP/ADV) 50 mL  1 g IntraVENous Q24H    famotidine (PF) (PEPCID) 20 mg in sodium chloride 0.9 % 10 mL injection  20 mg IntraVENous Q12H    docusate sodium (COLACE) capsule 100 mg  100 mg Oral DAILY    bisacodyl (DULCOLAX) tablet 5 mg  5 mg Oral DAILY PRN    Or    magnesium hydroxide (MILK OF MAGNESIA) 400 mg/5 mL oral suspension 30 mL  30 mL Oral DAILY PRN    promethazine (PHENERGAN) with saline injection 12.5 mg  12.5 mg IntraVENous Q6H PRN    gemfibrozil (LOPID) tablet 600 mg  600 mg Oral ACB&D    insulin lispro (HUMALOG) injection   SubCUTAneous Q6H    dextrose 40% (GLUTOSE) oral gel 1 Tube  15 g Oral PRN    glucagon (GLUCAGEN) injection 1 mg  1 mg IntraMUSCular PRN    dextrose (D50W) injection syrg 12.5-25 g  25-50 mL IntraVENous PRN    insulin regular (NOVOLIN R, HUMULIN R) 100 Units in 0.9% sodium chloride 100 mL infusion  1-10 Units/hr IntraVENous CONTINUOUS       Objective:     Physical Exam:  Vitals:  Visit Vitals    /70 (BP 1 Location: Right arm, BP Patient Position: At rest;Head of bed elevated (Comment degrees))    Pulse (!) 101    Temp 98.7 °F (37.1 °C)    Resp 18    Ht 5' 4\" (1.626 m)    Wt 103.2 kg (227 lb 9.6 oz)    SpO2 98%    Breastfeeding No    BMI 39.07 kg/m2       General: No acute distress. Skin:  Extremities and face reveal no rashes. No james erythema. No telangiectasias on the chest wall. HEENT: Sclerae anicteric. No oral ulcers. No abnormal pigmentation of the lips. The neck is supple. Cardiovascular: Regular rate and rhythm. No murmurs, gallops, or rubs. Respiratory:  Comfortable breathing  With no accessory muscle use. Clear breath sounds with no wheezes, rales, or rhonchi. GI:  Abdomen nondistended, soft, and nontender. Normal active bowel sounds. No enlargement of the liver or spleen. No masses palpable. Musculoskeletal:  No pitting edema of the lower legs. Extremities have good range of motion. Neurological:  Gross memory appears intact. Patient is alert and oriented. Psychiatric:  Mood appears appropriate with judgement intact. Lymphatic:  No cervical or supraclavicular adenopathy. Laboratory:    Recent Labs      08/18/17   0522   WBC  15.0*   RBC  3.54*   HGB  9.4*   HCT  29.9*   PLT  158      Recent Labs      08/18/17   0522   GLU  90   NA  135*   K  3.5   CL  103   CO2  24   BUN  4*   CREA  0.54*   CA  7.7*     No results for input(s): PTP, INR, APTT in the last 72 hours.     No lab exists for component: INREXT  Recent Labs      08/17/17   1118   ALB  2.3* Assessment:       A 27 y.o. female with acute pancreatitis due to TG's, improving alot      Plan:       Hopefully tomorrow we can try solid diet  Continue antibiotics for now given fluid loculations seen on CT      Signed By: Rema Adam MD     August 19, 2017

## 2017-08-19 NOTE — PROGRESS NOTES
Pt request medication for pain see MAR for dilaudid IV given  Pt also request medication for nausea, see MAR for zofran IV given

## 2017-08-19 NOTE — PROGRESS NOTES
Hospitalist Progress Note    2017  Admit Date: 2017  7:45 PM   NAME: Trevon Jon   :  1990   DOS:              17  MRN:  [id number]   Attending: Dakota Lee MD  PCP:  None  Treatment Team: Attending Provider: Dixie Teixeira MD; Consulting Provider: Yassine Dumas MD; Utilization Review: Charity Hernandez RN    Full Code     SUBJECTIVE:   As previously documented:       08/15/2017    Trevon Jon 27 yr female admitted for acute pancreatitis secondary to hypertriglyceridemia. Initially > 3000 now around 1000. Today pt is tachycardic and complaining of severs abdominal pain and decreased urine ouput. Seen by GI who have increased her pain meds, ivfls and are getting a repeat ct     Plasmapharesis was previously discussed with her- she was not agreeable. She now is if it is needed. 2017- pt seen - she still feels very ill- also very concerned because she has not passed gas or had bm. She is on stool softners. ct abdomen shows worsening changes c/w severe pancreatitis. She continues to be tachycardic with severe pain. 2017- seen - pain better- constipated- needs work note      - seen feeling better- passing gas not bm- not ready to tolerate a full diet  10+ ROS reviewed and negative except for positive in HPI.    No Known Allergies  Current Facility-Administered Medications   Medication Dose Route Frequency    metroNIDAZOLE (FLAGYL) IVPB premix 500 mg  500 mg IntraVENous Q12H    dextrose 5% infusion  25 mL/hr IntraVENous CONTINUOUS    bisacodyl (DULCOLAX) suppository 10 mg  10 mg Rectal DAILY PRN    pantoprazole (PROTONIX) 40 mg in sodium chloride 0.9 % 10 mL injection  40 mg IntraVENous DAILY    HYDROmorphone (PF) (DILAUDID) injection 2 mg  2 mg IntraVENous Q3H PRN    HYDROmorphone (PF) (DILAUDID) injection 1 mg  1 mg IntraVENous Q3H PRN    sodium chloride (NS) flush 5-10 mL  5-10 mL IntraVENous Q8H    sodium chloride (NS) flush 5-10 mL  5-10 mL IntraVENous PRN    0.9% sodium chloride infusion  150 mL/hr IntraVENous CONTINUOUS    acetaminophen (TYLENOL) tablet 650 mg  650 mg Oral Q4H PRN    oxyCODONE-acetaminophen (PERCOCET) 5-325 mg per tablet 1 Tab  1 Tab Oral Q4H PRN    naloxone (NARCAN) injection 0.4 mg  0.4 mg IntraVENous PRN    ondansetron (ZOFRAN) injection 4 mg  4 mg IntraVENous Q4H PRN    cefTRIAXone (ROCEPHIN) 1 g in 0.9% sodium chloride (MBP/ADV) 50 mL  1 g IntraVENous Q24H    famotidine (PF) (PEPCID) 20 mg in sodium chloride 0.9 % 10 mL injection  20 mg IntraVENous Q12H    docusate sodium (COLACE) capsule 100 mg  100 mg Oral DAILY    bisacodyl (DULCOLAX) tablet 5 mg  5 mg Oral DAILY PRN    Or    magnesium hydroxide (MILK OF MAGNESIA) 400 mg/5 mL oral suspension 30 mL  30 mL Oral DAILY PRN    promethazine (PHENERGAN) with saline injection 12.5 mg  12.5 mg IntraVENous Q6H PRN    gemfibrozil (LOPID) tablet 600 mg  600 mg Oral ACB&D    insulin lispro (HUMALOG) injection   SubCUTAneous Q6H    dextrose 40% (GLUTOSE) oral gel 1 Tube  15 g Oral PRN    glucagon (GLUCAGEN) injection 1 mg  1 mg IntraMUSCular PRN    dextrose (D50W) injection syrg 12.5-25 g  25-50 mL IntraVENous PRN    insulin regular (NOVOLIN R, HUMULIN R) 100 Units in 0.9% sodium chloride 100 mL infusion  1-10 Units/hr IntraVENous CONTINUOUS         Immunization History   Administered Date(s) Administered    Influenza Vaccine 11/04/2014     Objective:     Patient Vitals for the past 24 hrs:   Temp Pulse Resp BP SpO2   08/19/17 1149 98.3 °F (36.8 °C) (!) 108 22 129/66 97 %   08/19/17 1030 98.6 °F (37 °C) (!) 103 18 99/62 99 %   08/19/17 0750 98.7 °F (37.1 °C) (!) 101 18 123/70 98 %   08/19/17 0346 99.4 °F (37.4 °C) (!) 112 18 122/76 100 %   08/18/17 2300 99.3 °F (37.4 °C) (!) 110 18 110/60 100 %   08/18/17 2110 - - - - 100 %   08/18/17 1941 98.2 °F (36.8 °C) (!) 110 20 132/81 98 %   08/18/17 1518 99.8 °F (37.7 °C) - - - -   08/18/17 1400 (!) 100.5 °F (38.1 °C) (!) 116 22 134/76 98 %     Temp (24hrs), Av.1 °F (37.3 °C), Min:98.2 °F (36.8 °C), Max:100.5 °F (38.1 °C)    Oxygen Therapy  O2 Sat (%): 97 % (17 1149)  Pulse via Oximetry: 110 beats per minute (17 0854)  O2 Device: Room air (17 0225)  O2 Flow Rate (L/min): 0 l/min (17 0854)  Oxygen Therapy  O2 Sat (%): 97 % (17 1149)  Pulse via Oximetry: 110 beats per minute (1754)  O2 Device: Room air (175)  O2 Flow Rate (L/min): 0 l/min (1754)    Physical Exam:  General:         Alert, cooperative, no distress   HEENT:               NCAT. No obvious deformity. Nares normal. No drainage  Lungs:  CTABL. No wheezing/rhonchi/rales  Cardiovascular:   RRR. No m/r/g. No pedal edema b/l. +2 PT/DT pulses b/l. Abdomen:       S/nt/ mild tenderness hypotonic   Bowel sounds normal. .   Skin:         No rashes or lesions. Not Jaundiced  Neurologic:    AAOx3. CN II- XII grossly WNL. No gross focal deficit. Moves all extremities. Normal sensory. Normal gait. Psychiatric:         Good mood. Normal affect. Denies any SI/HI.                DIAGNOSTIC STUDIES      Data Review:   Recent Results (from the past 24 hour(s))   GLUCOSE, POC    Collection Time: 17  2:05 PM   Result Value Ref Range    Glucose (POC) 116 (H) 65 - 100 mg/dL   GLUCOSE, POC    Collection Time: 17  5:48 PM   Result Value Ref Range    Glucose (POC) 120 (H) 65 - 100 mg/dL   GLUCOSE, POC    Collection Time: 17 10:18 PM   Result Value Ref Range    Glucose (POC) 115 (H) 65 - 100 mg/dL   GLUCOSE, POC    Collection Time: 17  5:06 AM   Result Value Ref Range    Glucose (POC) 110 (H) 65 - 100 mg/dL   GLUCOSE, POC    Collection Time: 17 10:39 AM   Result Value Ref Range    Glucose (POC) 122 (H) 65 - 100 mg/dL       All Micro Results     Procedure Component Value Units Date/Time    CULTURE, URINE [[id number]] Collected:  17    Order Status:  Completed Specimen:  Urine from Clean catch Updated:  08/16/17 0702     Special Requests: NO SPECIAL REQUESTS        Culture result:         10,000 to 50,000 COLONIES/mL MIXED SKIN KENNETH ISOLATED          Imaging /Procedures /Studies:    CXR Results  (Last 48 hours)    None        CT Results  (Last 48 hours)    None        No results found. No results found for this visit on 08/13/17. Labs and Studies from previous 24 hours have been personally reviewed by myself    ASSESSMENT      Active Hospital Problems    Diagnosis Date Noted    Acute pancreatitis 08/14/2017    Leukocytosis 08/14/2017    Nausea and vomiting 08/14/2017    UTI (urinary tract infection) 08/14/2017    GERD (gastroesophageal reflux disease) 08/14/2017     Hospital Problems as of 8/19/2017  Never Reviewed          Codes Class Noted - Resolved POA    * (Principal)Acute pancreatitis ICD-10-CM: K85.90  ICD-9-CM: 577.0  8/14/2017 - Present Yes        Leukocytosis ICD-10-CM: D72.829  ICD-9-CM: 288.60  8/14/2017 - Present Yes        Nausea and vomiting ICD-10-CM: R11.2  ICD-9-CM: 787.01  8/14/2017 - Present Yes        UTI (urinary tract infection) ICD-10-CM: N39.0  ICD-9-CM: 599.0  8/14/2017 - Present Yes        GERD (gastroesophageal reflux disease) ICD-10-CM: K21.9  ICD-9-CM: 530.81  8/14/2017 - Present Yes              Plan:    Pancreatitis    Due to elevated triglyceride  Continue current mgt with insulin drip and  Gemfibrozil 600mg    Continue to monitor her blood  Glucose    Continue to monitor triglyceride levels   Continue D5W  Discuss with patient that if this does not improve she may need to be transferred for Apheresis  Repeat ct showed worsening and severe pancreatitis  GI input appreciated    Likely Sepsis  - pt is tachycardic, with wbc > 13 fever - 100.5 , tachycardic, with severe pancreatitis and possible underlying infection- already on bs abx will follow   - continue supportive care  - continue abx      Tachycardia  continue ivfls.   GI has added flagyl to to her rocephin    Decreased urine outpt-  unable to get novoa has a bedside commode- urine outpt is good but urine is dark    Obesity   Weight loss advised    Borderline diabetic / prediabetic  Discuss the need to loose weight   Pt may benefit from metformin after dc to aid with weight loss      Type I hyperlipidemia? No current family history possible autosomal recessive   Defer to opt work up       DVT Prophylaxis: heparin  CODE Status:   Plan of Care Discussed with: patient. Care team.  Medical Risk: high- pt is high risk given her severe pancreatitis, probable sepsis, at increase risk for morbidity and mortality.    Disposition: pending      Ileana Rosa MD  08/19/17

## 2017-08-19 NOTE — PROGRESS NOTES
Shift assessment complete.  Pt alert and oriented x4, respirations present, even and unlabored, HOB elevated, pt denies any SOB at this time, S1&S2 auscultated, HR regular, abd soft, tender, active BS in all 4 quadrants, No pressure ulcers or edema noted, pt is up ad candelaria to the bathroom, voiding, SCDs in place and functioning, IVF infusing without difficulty, pt states pain at this time, pt instructed to call for assistance, pt verbalizes understanding, bed low and locked, side rails x3, call light within reach.

## 2017-08-19 NOTE — PROGRESS NOTES
08/18/17 2110   Oxygen Therapy   O2 Sat (%) 100 %   O2 Device Room air   No sob noted. CS not on . Pt staters amolunt of Diladid was decreased.

## 2017-08-20 LAB
ANION GAP SERPL CALC-SCNC: 12 MMOL/L (ref 7–16)
BUN SERPL-MCNC: 4 MG/DL (ref 6–23)
CALCIUM SERPL-MCNC: 8.2 MG/DL (ref 8.3–10.4)
CHLORIDE SERPL-SCNC: 104 MMOL/L (ref 98–107)
CO2 SERPL-SCNC: 24 MMOL/L (ref 21–32)
CREAT SERPL-MCNC: 0.57 MG/DL (ref 0.6–1)
GLUCOSE BLD STRIP.AUTO-MCNC: 82 MG/DL (ref 65–100)
GLUCOSE BLD STRIP.AUTO-MCNC: 85 MG/DL (ref 65–100)
GLUCOSE BLD STRIP.AUTO-MCNC: 90 MG/DL (ref 65–100)
GLUCOSE BLD STRIP.AUTO-MCNC: 93 MG/DL (ref 65–100)
GLUCOSE BLD STRIP.AUTO-MCNC: 97 MG/DL (ref 65–100)
GLUCOSE SERPL-MCNC: 92 MG/DL (ref 65–100)
MAGNESIUM SERPL-MCNC: 2 MG/DL (ref 1.8–2.4)
POTASSIUM SERPL-SCNC: 3.2 MMOL/L (ref 3.5–5.1)
SODIUM SERPL-SCNC: 140 MMOL/L (ref 136–145)
TRIGL SERPL-MCNC: 485 MG/DL (ref 35–150)

## 2017-08-20 PROCEDURE — 74011250637 HC RX REV CODE- 250/637: Performed by: HOSPITALIST

## 2017-08-20 PROCEDURE — 85025 COMPLETE CBC W/AUTO DIFF WBC: CPT | Performed by: INTERNAL MEDICINE

## 2017-08-20 PROCEDURE — 85025 COMPLETE CBC W/AUTO DIFF WBC: CPT | Performed by: HOSPITALIST

## 2017-08-20 PROCEDURE — 74011000258 HC RX REV CODE- 258: Performed by: HOSPITALIST

## 2017-08-20 PROCEDURE — C9113 INJ PANTOPRAZOLE SODIUM, VIA: HCPCS | Performed by: NURSE PRACTITIONER

## 2017-08-20 PROCEDURE — 74011250636 HC RX REV CODE- 250/636: Performed by: NURSE PRACTITIONER

## 2017-08-20 PROCEDURE — 36415 COLL VENOUS BLD VENIPUNCTURE: CPT | Performed by: INTERNAL MEDICINE

## 2017-08-20 PROCEDURE — 74011000250 HC RX REV CODE- 250: Performed by: INTERNAL MEDICINE

## 2017-08-20 PROCEDURE — 74011250637 HC RX REV CODE- 250/637: Performed by: INTERNAL MEDICINE

## 2017-08-20 PROCEDURE — 74011636637 HC RX REV CODE- 636/637: Performed by: HOSPITALIST

## 2017-08-20 PROCEDURE — 74011250636 HC RX REV CODE- 250/636: Performed by: HOSPITALIST

## 2017-08-20 PROCEDURE — 80048 BASIC METABOLIC PNL TOTAL CA: CPT | Performed by: INTERNAL MEDICINE

## 2017-08-20 PROCEDURE — 77030032490 HC SLV COMPR SCD KNE COVD -B

## 2017-08-20 PROCEDURE — 65270000029 HC RM PRIVATE

## 2017-08-20 PROCEDURE — 74011000250 HC RX REV CODE- 250: Performed by: HOSPITALIST

## 2017-08-20 PROCEDURE — 82962 GLUCOSE BLOOD TEST: CPT

## 2017-08-20 PROCEDURE — 84478 ASSAY OF TRIGLYCERIDES: CPT | Performed by: INTERNAL MEDICINE

## 2017-08-20 PROCEDURE — 74011000250 HC RX REV CODE- 250: Performed by: NURSE PRACTITIONER

## 2017-08-20 PROCEDURE — 83735 ASSAY OF MAGNESIUM: CPT | Performed by: INTERNAL MEDICINE

## 2017-08-20 RX ORDER — POTASSIUM CHLORIDE 20 MEQ/1
40 TABLET, EXTENDED RELEASE ORAL EVERY 4 HOURS
Status: COMPLETED | OUTPATIENT
Start: 2017-08-20 | End: 2017-08-20

## 2017-08-20 RX ADMIN — SODIUM CHLORIDE 40 MG: 9 INJECTION INTRAMUSCULAR; INTRAVENOUS; SUBCUTANEOUS at 08:09

## 2017-08-20 RX ADMIN — SODIUM CHLORIDE 150 ML/HR: 900 INJECTION, SOLUTION INTRAVENOUS at 15:02

## 2017-08-20 RX ADMIN — ONDANSETRON 4 MG: 2 INJECTION INTRAMUSCULAR; INTRAVENOUS at 04:02

## 2017-08-20 RX ADMIN — OXYCODONE HYDROCHLORIDE AND ACETAMINOPHEN 1 TABLET: 5; 325 TABLET ORAL at 08:06

## 2017-08-20 RX ADMIN — HYDROMORPHONE HYDROCHLORIDE 1 MG: 1 INJECTION, SOLUTION INTRAMUSCULAR; INTRAVENOUS; SUBCUTANEOUS at 01:12

## 2017-08-20 RX ADMIN — OXYCODONE HYDROCHLORIDE AND ACETAMINOPHEN 1 TABLET: 5; 325 TABLET ORAL at 04:02

## 2017-08-20 RX ADMIN — FAMOTIDINE 20 MG: 10 INJECTION, SOLUTION INTRAVENOUS at 09:55

## 2017-08-20 RX ADMIN — HYDROMORPHONE HYDROCHLORIDE 1 MG: 1 INJECTION, SOLUTION INTRAMUSCULAR; INTRAVENOUS; SUBCUTANEOUS at 14:49

## 2017-08-20 RX ADMIN — ONDANSETRON 4 MG: 2 INJECTION INTRAMUSCULAR; INTRAVENOUS at 19:49

## 2017-08-20 RX ADMIN — SODIUM CHLORIDE 150 ML/HR: 900 INJECTION, SOLUTION INTRAVENOUS at 06:12

## 2017-08-20 RX ADMIN — SODIUM CHLORIDE 2 UNITS/HR: 900 INJECTION, SOLUTION INTRAVENOUS at 09:56

## 2017-08-20 RX ADMIN — Medication 10 ML: at 06:14

## 2017-08-20 RX ADMIN — GEMFIBROZIL 600 MG: 600 TABLET ORAL at 08:06

## 2017-08-20 RX ADMIN — HYDROMORPHONE HYDROCHLORIDE 1 MG: 1 INJECTION, SOLUTION INTRAMUSCULAR; INTRAVENOUS; SUBCUTANEOUS at 19:43

## 2017-08-20 RX ADMIN — FAMOTIDINE 20 MG: 10 INJECTION, SOLUTION INTRAVENOUS at 21:49

## 2017-08-20 RX ADMIN — METRONIDAZOLE 500 MG: 500 INJECTION, SOLUTION INTRAVENOUS at 16:56

## 2017-08-20 RX ADMIN — METRONIDAZOLE 500 MG: 500 INJECTION, SOLUTION INTRAVENOUS at 04:02

## 2017-08-20 RX ADMIN — DOCUSATE SODIUM 100 MG: 100 CAPSULE, LIQUID FILLED ORAL at 08:06

## 2017-08-20 RX ADMIN — POTASSIUM CHLORIDE 40 MEQ: 20 TABLET, EXTENDED RELEASE ORAL at 10:04

## 2017-08-20 RX ADMIN — CEFTRIAXONE 1 G: 1 INJECTION, POWDER, FOR SOLUTION INTRAMUSCULAR; INTRAVENOUS at 23:52

## 2017-08-20 RX ADMIN — GEMFIBROZIL 600 MG: 600 TABLET ORAL at 17:02

## 2017-08-20 RX ADMIN — OXYCODONE HYDROCHLORIDE AND ACETAMINOPHEN 1 TABLET: 5; 325 TABLET ORAL at 22:02

## 2017-08-20 RX ADMIN — POTASSIUM CHLORIDE 40 MEQ: 20 TABLET, EXTENDED RELEASE ORAL at 14:21

## 2017-08-20 RX ADMIN — ONDANSETRON 4 MG: 2 INJECTION INTRAMUSCULAR; INTRAVENOUS at 14:55

## 2017-08-20 NOTE — PROGRESS NOTES
Shift assessment complete. Pt alert and oriented x4, respirations present, even and unlabored, HOB elevated, pt denies any SOB at this time, S1&S2 auscultated, HR regular, abd soft and tender, Active BS in all 4 quadrants, No pressure ulcers or edema noted, pt is up with assistance to the bedside toilet, voiding,  IVF infusing without difficulty, pt instructed to call for assistance, pt verbalizes understanding, bed low and locked, side rails x3, call light within reach.

## 2017-08-20 NOTE — PROGRESS NOTES
Hospitalist Progress Note    2017  Admit Date: 2017  7:45 PM   NAME: Jim Sanches   :  1990   DOS:              17  MRN:  [id number]   Attending: Zaki Smith MD  PCP:  None  Treatment Team: Attending Provider: Flori Trujillo MD; Consulting Provider: Velma Souza MD; Utilization Review: Beth Gotti RN    Full Code     SUBJECTIVE:   As previously documented:       08/15/2017    Jim Sanches 27 yr female admitted for acute pancreatitis secondary to hypertriglyceridemia. Initially > 3000 now around 1000. Today pt is tachycardic and complaining of severs abdominal pain and decreased urine ouput. Seen by GI who have increased her pain meds, ivfls and are getting a repeat ct     Plasmapharesis was previously discussed with her- she was not agreeable. She now is if it is needed. 2017- pt seen - she still feels very ill- also very concerned because she has not passed gas or had bm. She is on stool softners. ct abdomen shows worsening changes c/w severe pancreatitis. She continues to be tachycardic with severe pain. 2017- seen - pain better- constipated- needs work note      - seen feeling better- passing gas not bm- not ready to tolerate a full diet    2017- passing gas and some small bm- willing to dry diet today also encouraged to try and walk to aid return of bowel function and getting better  10+ ROS reviewed and negative except for positive in HPI.    No Known Allergies  Current Facility-Administered Medications   Medication Dose Route Frequency    potassium chloride (K-DUR, KLOR-CON) SR tablet 40 mEq  40 mEq Oral Q4H    metroNIDAZOLE (FLAGYL) IVPB premix 500 mg  500 mg IntraVENous Q12H    dextrose 5% infusion  25 mL/hr IntraVENous CONTINUOUS    bisacodyl (DULCOLAX) suppository 10 mg  10 mg Rectal DAILY PRN    pantoprazole (PROTONIX) 40 mg in sodium chloride 0.9 % 10 mL injection  40 mg IntraVENous DAILY    HYDROmorphone (PF) (DILAUDID) injection 2 mg  2 mg IntraVENous Q3H PRN    HYDROmorphone (PF) (DILAUDID) injection 1 mg  1 mg IntraVENous Q3H PRN    sodium chloride (NS) flush 5-10 mL  5-10 mL IntraVENous Q8H    sodium chloride (NS) flush 5-10 mL  5-10 mL IntraVENous PRN    0.9% sodium chloride infusion  150 mL/hr IntraVENous CONTINUOUS    acetaminophen (TYLENOL) tablet 650 mg  650 mg Oral Q4H PRN    oxyCODONE-acetaminophen (PERCOCET) 5-325 mg per tablet 1 Tab  1 Tab Oral Q4H PRN    naloxone (NARCAN) injection 0.4 mg  0.4 mg IntraVENous PRN    ondansetron (ZOFRAN) injection 4 mg  4 mg IntraVENous Q4H PRN    cefTRIAXone (ROCEPHIN) 1 g in 0.9% sodium chloride (MBP/ADV) 50 mL  1 g IntraVENous Q24H    famotidine (PF) (PEPCID) 20 mg in sodium chloride 0.9 % 10 mL injection  20 mg IntraVENous Q12H    docusate sodium (COLACE) capsule 100 mg  100 mg Oral DAILY    bisacodyl (DULCOLAX) tablet 5 mg  5 mg Oral DAILY PRN    Or    magnesium hydroxide (MILK OF MAGNESIA) 400 mg/5 mL oral suspension 30 mL  30 mL Oral DAILY PRN    promethazine (PHENERGAN) with saline injection 12.5 mg  12.5 mg IntraVENous Q6H PRN    gemfibrozil (LOPID) tablet 600 mg  600 mg Oral ACB&D    insulin lispro (HUMALOG) injection   SubCUTAneous Q6H    dextrose 40% (GLUTOSE) oral gel 1 Tube  15 g Oral PRN    glucagon (GLUCAGEN) injection 1 mg  1 mg IntraMUSCular PRN    dextrose (D50W) injection syrg 12.5-25 g  25-50 mL IntraVENous PRN    insulin regular (NOVOLIN R, HUMULIN R) 100 Units in 0.9% sodium chloride 100 mL infusion  1-10 Units/hr IntraVENous CONTINUOUS         Immunization History   Administered Date(s) Administered    Influenza Vaccine 11/04/2014     Objective:     Patient Vitals for the past 24 hrs:   Temp Pulse Resp BP SpO2   08/20/17 0358 97.8 °F (36.6 °C) 99 16 123/62 99 %   08/19/17 2344 98.6 °F (37 °C) 97 18 133/82 99 %   08/19/17 2052 - - - - 99 %   08/19/17 1835 99.3 °F (37.4 °C) 96 16 120/79 97 %   08/19/17 1415 98.5 °F (36.9 °C) 99 18 117/69 96 %   17 1149 98.3 °F (36.8 °C) (!) 108 22 129/66 97 %   17 1030 98.6 °F (37 °C) (!) 103 18 99/62 99 %     Temp (24hrs), Av.5 °F (36.9 °C), Min:97.8 °F (36.6 °C), Max:99.3 °F (37.4 °C)    Oxygen Therapy  O2 Sat (%): 99 % (178)  Pulse via Oximetry: 106 beats per minute (17)  O2 Device: Room air (17 0245)  O2 Flow Rate (L/min): 0 l/min (1754)  Oxygen Therapy  O2 Sat (%): 99 % (17)  Pulse via Oximetry: 106 beats per minute (17)  O2 Device: Room air (175)  O2 Flow Rate (L/min): 0 l/min (1754)    Physical Exam:  General:         Alert, cooperative, no distress   HEENT:               NCAT. No obvious deformity. Nares normal. No drainage  Lungs:  CTABL. No wheezing/rhonchi/rales  Cardiovascular:   RRR. No m/r/g. No pedal edema b/l. +2 PT/DT pulses b/l. Abdomen:       S/nt/ mild tenderness hypotonic   Bowel sounds normal. .   Skin:         No rashes or lesions. Not Jaundiced  Neurologic:    AAOx3. CN II- XII grossly WNL. No gross focal deficit. Moves all extremities. Normal sensory. Normal gait. Psychiatric:         Good mood. Normal affect. Denies any SI/HI.                DIAGNOSTIC STUDIES      Data Review:   Recent Results (from the past 24 hour(s))   GLUCOSE, POC    Collection Time: 17 10:39 AM   Result Value Ref Range    Glucose (POC) 122 (H) 65 - 340 mg/dL   METABOLIC PANEL, BASIC    Collection Time: 17 12:40 PM   Result Value Ref Range    Sodium 137 136 - 145 mmol/L    Potassium 3.2 (L) 3.5 - 5.1 mmol/L    Chloride 102 98 - 107 mmol/L    CO2 23 21 - 32 mmol/L    Anion gap 12 7 - 16 mmol/L    Glucose 120 (H) 65 - 100 mg/dL    BUN 3 (L) 6 - 23 MG/DL    Creatinine 0.58 (L) 0.6 - 1.0 MG/DL    GFR est AA >60 >60 ml/min/1.73m2    GFR est non-AA >60 >60 ml/min/1.73m2    Calcium 8.4 8.3 - 10.4 MG/DL   CBC WITH AUTOMATED DIFF    Collection Time: 17 12:40 PM   Result Value Ref Range WBC 13.8 (H) 4.3 - 11.1 K/uL    RBC 3.66 (L) 4.05 - 5.25 M/uL    HGB 9.7 (L) 11.7 - 15.4 g/dL    HCT 30.4 (L) 35.8 - 46.3 %    MCV 83.1 79.6 - 97.8 FL    MCH 26.5 26.1 - 32.9 PG    MCHC 31.9 31.4 - 35.0 g/dL    RDW 15.2 (H) 11.9 - 14.6 %    PLATELET 623 946 - 177 K/uL    MPV 10.3 (L) 10.8 - 14.1 FL    DF AUTOMATED      NEUTROPHILS 77 43 - 78 %    LYMPHOCYTES 11 (L) 13 - 44 %    MONOCYTES 10 4.0 - 12.0 %    EOSINOPHILS 1 0.5 - 7.8 %    BASOPHILS 0 0.0 - 2.0 %    IMMATURE GRANULOCYTES 1.3 0.0 - 5.0 %    ABS. NEUTROPHILS 10.6 (H) 1.7 - 8.2 K/UL    ABS. LYMPHOCYTES 1.6 0.5 - 4.6 K/UL    ABS. MONOCYTES 1.3 0.1 - 1.3 K/UL    ABS. EOSINOPHILS 0.1 0.0 - 0.8 K/UL    ABS. BASOPHILS 0.0 0.0 - 0.2 K/UL    ABS. IMM.  GRANS. 0.2 0.0 - 0.5 K/UL   TRIGLYCERIDE    Collection Time: 08/19/17 12:40 PM   Result Value Ref Range    Triglyceride 517 (H) 35 - 150 MG/DL   GLUCOSE, POC    Collection Time: 08/19/17  2:08 PM   Result Value Ref Range    Glucose (POC) 98 65 - 100 mg/dL   GLUCOSE, POC    Collection Time: 08/19/17  6:39 PM   Result Value Ref Range    Glucose (POC) 155 (H) 65 - 100 mg/dL   GLUCOSE, POC    Collection Time: 08/19/17 11:47 PM   Result Value Ref Range    Glucose (POC) 95 65 - 470 mg/dL   METABOLIC PANEL, BASIC    Collection Time: 08/20/17  5:52 AM   Result Value Ref Range    Sodium 140 136 - 145 mmol/L    Potassium 3.2 (L) 3.5 - 5.1 mmol/L    Chloride 104 98 - 107 mmol/L    CO2 24 21 - 32 mmol/L    Anion gap 12 7 - 16 mmol/L    Glucose 92 65 - 100 mg/dL    BUN 4 (L) 6 - 23 MG/DL    Creatinine 0.57 (L) 0.6 - 1.0 MG/DL    GFR est AA >60 >60 ml/min/1.73m2    GFR est non-AA >60 >60 ml/min/1.73m2    Calcium 8.2 (L) 8.3 - 10.4 MG/DL   CBC WITH AUTOMATED DIFF    Collection Time: 08/20/17  5:52 AM   Result Value Ref Range    WBC 12.7 (H) 4.3 - 11.1 K/uL    RBC 3.66 (L) 4.05 - 5.25 M/uL    HGB 9.7 (L) 11.7 - 15.4 g/dL    HCT 30.4 (L) 35.8 - 46.3 %    MCV 83.1 79.6 - 97.8 FL    MCH 26.5 26.1 - 32.9 PG    MCHC 31.9 31.4 - 35.0 g/dL RDW 15.2 (H) 11.9 - 14.6 %    PLATELET 354 531 - 762 K/uL    MPV 10.4 (L) 10.8 - 14.1 FL    DF AUTOMATED      NEUTROPHILS 72 43 - 78 %    LYMPHOCYTES 15 13 - 44 %    MONOCYTES 10 4.0 - 12.0 %    EOSINOPHILS 1 0.5 - 7.8 %    BASOPHILS 0 0.0 - 2.0 %    IMMATURE GRANULOCYTES 1.7 0.0 - 5.0 %    ABS. NEUTROPHILS 9.1 (H) 1.7 - 8.2 K/UL    ABS. LYMPHOCYTES 1.9 0.5 - 4.6 K/UL    ABS. MONOCYTES 1.3 0.1 - 1.3 K/UL    ABS. EOSINOPHILS 0.2 0.0 - 0.8 K/UL    ABS. BASOPHILS 0.0 0.0 - 0.2 K/UL    ABS. IMM. GRANS. 0.2 0.0 - 0.5 K/UL   TRIGLYCERIDE    Collection Time: 08/20/17  5:52 AM   Result Value Ref Range    Triglyceride 485 (H) 35 - 150 MG/DL   GLUCOSE, POC    Collection Time: 08/20/17  6:10 AM   Result Value Ref Range    Glucose (POC) 85 65 - 100 mg/dL       All Micro Results     Procedure Component Value Units Date/Time    CULTURE, URINE [[id number]] Collected:  08/13/17 2005    Order Status:  Completed Specimen:  Urine from Clean catch Updated:  08/16/17 0702     Special Requests: NO SPECIAL REQUESTS        Culture result:         10,000 to 50,000 COLONIES/mL MIXED SKIN KENNETH ISOLATED          Imaging /Procedures /Studies:    CXR Results  (Last 48 hours)    None        CT Results  (Last 48 hours)    None        No results found. No results found for this visit on 08/13/17.     Labs and Studies from previous 24 hours have been personally reviewed by myself Stan Barber 96 Problems    Diagnosis Date Noted    Acute pancreatitis 08/14/2017    Leukocytosis 08/14/2017    Nausea and vomiting 08/14/2017    UTI (urinary tract infection) 08/14/2017    GERD (gastroesophageal reflux disease) 08/14/2017     Hospital Problems as of 8/20/2017  Never Reviewed          Codes Class Noted - Resolved POA    * (Principal)Acute pancreatitis ICD-10-CM: K85.90  ICD-9-CM: 577.0  8/14/2017 - Present Yes        Leukocytosis ICD-10-CM: I16.253  ICD-9-CM: 288.60  8/14/2017 - Present Yes        Nausea and vomiting ICD-10-CM: R11.2  ICD-9-CM: 787.01  8/14/2017 - Present Yes        UTI (urinary tract infection) ICD-10-CM: N39.0  ICD-9-CM: 599.0  8/14/2017 - Present Yes        GERD (gastroesophageal reflux disease) ICD-10-CM: K21.9  ICD-9-CM: 530.81  8/14/2017 - Present Yes              Plan:    Pancreatitis    Due to elevated triglyceride  Continue current mgt with insulin drip and  Gemfibrozil 600mg    Continue to monitor her blood  Glucose    Continue to monitor triglyceride levels   Continue D5W  Discuss with patient that if this does not improve she may need to be transferred for Apheresis but this seems less likely now given her clinical improvement   Repeat ct abdomen pelvis showed worsening and severe pancreatitis  GI input appreciated  Will start low fat bland gi diet today and monitor    Likely Sepsis  - pt was extremely tachycardic, with wbc > 25 fever - > 100.5 , tachypneic, with severe pancreatitis and possible underlying infection- but this seems to be resolving  - continue supportive care  - continue abx      Tachycardia  continue ivfls. Decreased urine outpt-  resolved    Obesity   Weight loss advised    Borderline diabetic / prediabetic  Discuss the need to loose weight   Pt may benefit from metformin after dc to aid with weight loss      Type I hyperlipidemia? No current family history possible autosomal recessive   Defer to opt work up       DVT Prophylaxis: heparin  CODE Status: full  Plan of Care Discussed with: patient.  Care team.  Medical Risk: moderate pt is now moderate risk given her severe pancreatitis  Disposition: pending      Xiomara Mon MD  08/20/17

## 2017-08-20 NOTE — PROGRESS NOTES
Lying supine no acute distress, lungs clear bilateral A/P, condition stable, abd tender aching sensation grade level of pain 7, mother at bedside.

## 2017-08-20 NOTE — PROGRESS NOTES
Assessment done. Bowel sounds hypoactive in RUQ. Pain improving. Lung sounds clear-diminished in right lower lobe. Heart sounds normal. Respirations unlabored. Medicated for pain of a 7 on 0-10 scale. Will reassess for relief. Patient is currently resting in bed. Will continue to monitor.

## 2017-08-21 LAB
ANION GAP SERPL CALC-SCNC: 14 MMOL/L (ref 7–16)
BASOPHILS # BLD: 0 K/UL (ref 0–0.2)
BASOPHILS # BLD: 0 K/UL (ref 0–0.2)
BASOPHILS NFR BLD: 0 % (ref 0–2)
BASOPHILS NFR BLD: 0 % (ref 0–2)
BUN SERPL-MCNC: 3 MG/DL (ref 6–23)
CALCIUM SERPL-MCNC: 8.6 MG/DL (ref 8.3–10.4)
CHLORIDE SERPL-SCNC: 105 MMOL/L (ref 98–107)
CO2 SERPL-SCNC: 19 MMOL/L (ref 21–32)
CREAT SERPL-MCNC: 0.49 MG/DL (ref 0.6–1)
DIFFERENTIAL METHOD BLD: ABNORMAL
DIFFERENTIAL METHOD BLD: ABNORMAL
EOSINOPHIL # BLD: 0.2 K/UL (ref 0–0.8)
EOSINOPHIL # BLD: 0.2 K/UL (ref 0–0.8)
EOSINOPHIL NFR BLD: 1 % (ref 0.5–7.8)
EOSINOPHIL NFR BLD: 2 % (ref 0.5–7.8)
ERYTHROCYTE [DISTWIDTH] IN BLOOD BY AUTOMATED COUNT: 15.2 % (ref 11.9–14.6)
ERYTHROCYTE [DISTWIDTH] IN BLOOD BY AUTOMATED COUNT: 15.3 % (ref 11.9–14.6)
GLUCOSE BLD STRIP.AUTO-MCNC: 103 MG/DL (ref 65–100)
GLUCOSE BLD STRIP.AUTO-MCNC: 105 MG/DL (ref 65–100)
GLUCOSE BLD STRIP.AUTO-MCNC: 87 MG/DL (ref 65–100)
GLUCOSE BLD STRIP.AUTO-MCNC: 92 MG/DL (ref 65–100)
GLUCOSE BLD STRIP.AUTO-MCNC: 97 MG/DL (ref 65–100)
GLUCOSE SERPL-MCNC: 84 MG/DL (ref 65–100)
HCT VFR BLD AUTO: 30.4 % (ref 35.8–46.3)
HCT VFR BLD AUTO: 35.2 % (ref 35.8–46.3)
HGB BLD-MCNC: 11.3 G/DL (ref 11.7–15.4)
HGB BLD-MCNC: 9.7 G/DL (ref 11.7–15.4)
IMM GRANULOCYTES # BLD: 0.2 K/UL (ref 0–0.5)
IMM GRANULOCYTES # BLD: 0.2 K/UL (ref 0–0.5)
IMM GRANULOCYTES NFR BLD: 1.7 % (ref 0–5)
IMM GRANULOCYTES NFR BLD: 1.8 % (ref 0–5)
LYMPHOCYTES # BLD: 1.7 K/UL (ref 0.5–4.6)
LYMPHOCYTES # BLD: 1.9 K/UL (ref 0.5–4.6)
LYMPHOCYTES NFR BLD: 14 % (ref 13–44)
LYMPHOCYTES NFR BLD: 15 % (ref 13–44)
MCH RBC QN AUTO: 26.5 PG (ref 26.1–32.9)
MCH RBC QN AUTO: 26.9 PG (ref 26.1–32.9)
MCHC RBC AUTO-ENTMCNC: 31.9 G/DL (ref 31.4–35)
MCHC RBC AUTO-ENTMCNC: 32.1 G/DL (ref 31.4–35)
MCV RBC AUTO: 83.1 FL (ref 79.6–97.8)
MCV RBC AUTO: 83.8 FL (ref 79.6–97.8)
MONOCYTES # BLD: 1.1 K/UL (ref 0.1–1.3)
MONOCYTES # BLD: 1.3 K/UL (ref 0.1–1.3)
MONOCYTES NFR BLD: 10 % (ref 4–12)
MONOCYTES NFR BLD: 8 % (ref 4–12)
NEUTS SEG # BLD: 9.1 K/UL (ref 1.7–8.2)
NEUTS SEG # BLD: 9.5 K/UL (ref 1.7–8.2)
NEUTS SEG NFR BLD: 72 % (ref 43–78)
NEUTS SEG NFR BLD: 74 % (ref 43–78)
PLATELET # BLD AUTO: 199 K/UL (ref 150–450)
PLATELET # BLD AUTO: 221 K/UL (ref 150–450)
PMV BLD AUTO: 10.2 FL (ref 10.8–14.1)
PMV BLD AUTO: 10.4 FL (ref 10.8–14.1)
POTASSIUM SERPL-SCNC: 4.6 MMOL/L (ref 3.5–5.1)
RBC # BLD AUTO: 3.66 M/UL (ref 4.05–5.25)
RBC # BLD AUTO: 4.2 M/UL (ref 4.05–5.25)
SODIUM SERPL-SCNC: 138 MMOL/L (ref 136–145)
TRIGL SERPL-MCNC: 493 MG/DL (ref 35–150)
WBC # BLD AUTO: 12.7 K/UL (ref 4.3–11.1)
WBC # BLD AUTO: 12.8 K/UL (ref 4.3–11.1)

## 2017-08-21 PROCEDURE — 84478 ASSAY OF TRIGLYCERIDES: CPT | Performed by: INTERNAL MEDICINE

## 2017-08-21 PROCEDURE — 85025 COMPLETE CBC W/AUTO DIFF WBC: CPT | Performed by: HOSPITALIST

## 2017-08-21 PROCEDURE — 74011250636 HC RX REV CODE- 250/636: Performed by: NURSE PRACTITIONER

## 2017-08-21 PROCEDURE — 36415 COLL VENOUS BLD VENIPUNCTURE: CPT | Performed by: INTERNAL MEDICINE

## 2017-08-21 PROCEDURE — 65270000029 HC RM PRIVATE

## 2017-08-21 PROCEDURE — 74011250636 HC RX REV CODE- 250/636: Performed by: HOSPITALIST

## 2017-08-21 PROCEDURE — 74011000250 HC RX REV CODE- 250: Performed by: HOSPITALIST

## 2017-08-21 PROCEDURE — 80048 BASIC METABOLIC PNL TOTAL CA: CPT | Performed by: INTERNAL MEDICINE

## 2017-08-21 PROCEDURE — 74011000250 HC RX REV CODE- 250: Performed by: NURSE PRACTITIONER

## 2017-08-21 PROCEDURE — 74011250637 HC RX REV CODE- 250/637: Performed by: HOSPITALIST

## 2017-08-21 PROCEDURE — 82962 GLUCOSE BLOOD TEST: CPT

## 2017-08-21 PROCEDURE — 74011000250 HC RX REV CODE- 250: Performed by: INTERNAL MEDICINE

## 2017-08-21 PROCEDURE — C9113 INJ PANTOPRAZOLE SODIUM, VIA: HCPCS | Performed by: NURSE PRACTITIONER

## 2017-08-21 RX ORDER — METRONIDAZOLE 500 MG/1
500 TABLET ORAL 2 TIMES DAILY
Status: DISCONTINUED | OUTPATIENT
Start: 2017-08-21 | End: 2017-08-21

## 2017-08-21 RX ORDER — METRONIDAZOLE 500 MG/1
500 TABLET ORAL 2 TIMES DAILY
Status: DISCONTINUED | OUTPATIENT
Start: 2017-08-22 | End: 2017-08-22 | Stop reason: HOSPADM

## 2017-08-21 RX ORDER — ONDANSETRON 4 MG/1
4 TABLET, ORALLY DISINTEGRATING ORAL
Status: DISCONTINUED | OUTPATIENT
Start: 2017-08-21 | End: 2017-08-22 | Stop reason: HOSPADM

## 2017-08-21 RX ORDER — FAMOTIDINE 20 MG/1
20 TABLET, FILM COATED ORAL 2 TIMES DAILY
Status: DISCONTINUED | OUTPATIENT
Start: 2017-08-21 | End: 2017-08-22

## 2017-08-21 RX ADMIN — HYDROMORPHONE HYDROCHLORIDE 1 MG: 1 INJECTION, SOLUTION INTRAMUSCULAR; INTRAVENOUS; SUBCUTANEOUS at 01:02

## 2017-08-21 RX ADMIN — OXYCODONE HYDROCHLORIDE AND ACETAMINOPHEN 1 TABLET: 5; 325 TABLET ORAL at 10:54

## 2017-08-21 RX ADMIN — ONDANSETRON 4 MG: 2 INJECTION INTRAMUSCULAR; INTRAVENOUS at 16:48

## 2017-08-21 RX ADMIN — OXYCODONE HYDROCHLORIDE AND ACETAMINOPHEN 1 TABLET: 5; 325 TABLET ORAL at 02:53

## 2017-08-21 RX ADMIN — LIDOCAINE HYDROCHLORIDE 1 G: 10 INJECTION, SOLUTION INFILTRATION; PERINEURAL at 22:37

## 2017-08-21 RX ADMIN — SODIUM CHLORIDE 150 ML/HR: 900 INJECTION, SOLUTION INTRAVENOUS at 01:09

## 2017-08-21 RX ADMIN — ONDANSETRON 4 MG: 4 TABLET, ORALLY DISINTEGRATING ORAL at 22:36

## 2017-08-21 RX ADMIN — FAMOTIDINE 20 MG: 20 TABLET ORAL at 22:37

## 2017-08-21 RX ADMIN — DOCUSATE SODIUM 100 MG: 100 CAPSULE, LIQUID FILLED ORAL at 09:34

## 2017-08-21 RX ADMIN — FAMOTIDINE 20 MG: 10 INJECTION, SOLUTION INTRAVENOUS at 09:34

## 2017-08-21 RX ADMIN — OXYCODONE HYDROCHLORIDE AND ACETAMINOPHEN 1 TABLET: 5; 325 TABLET ORAL at 06:36

## 2017-08-21 RX ADMIN — Medication 5 ML: at 22:35

## 2017-08-21 RX ADMIN — GEMFIBROZIL 600 MG: 600 TABLET ORAL at 09:34

## 2017-08-21 RX ADMIN — ONDANSETRON 4 MG: 2 INJECTION INTRAMUSCULAR; INTRAVENOUS at 01:06

## 2017-08-21 RX ADMIN — ONDANSETRON 4 MG: 2 INJECTION INTRAMUSCULAR; INTRAVENOUS at 10:54

## 2017-08-21 RX ADMIN — OXYCODONE HYDROCHLORIDE AND ACETAMINOPHEN 1 TABLET: 5; 325 TABLET ORAL at 22:36

## 2017-08-21 RX ADMIN — SODIUM CHLORIDE 40 MG: 9 INJECTION INTRAMUSCULAR; INTRAVENOUS; SUBCUTANEOUS at 09:34

## 2017-08-21 RX ADMIN — OXYCODONE HYDROCHLORIDE AND ACETAMINOPHEN 1 TABLET: 5; 325 TABLET ORAL at 16:48

## 2017-08-21 RX ADMIN — GEMFIBROZIL 600 MG: 600 TABLET ORAL at 16:43

## 2017-08-21 RX ADMIN — METRONIDAZOLE 500 MG: 500 INJECTION, SOLUTION INTRAVENOUS at 16:43

## 2017-08-21 RX ADMIN — METRONIDAZOLE 500 MG: 500 INJECTION, SOLUTION INTRAVENOUS at 03:03

## 2017-08-21 NOTE — PROGRESS NOTES
Problem: Interdisciplinary Rounds  Goal: Interdisciplinary Rounds  Interdisciplinary team rounds were held 8/21/2017 with the following team members:Care Management, Nursing, Nutrition, Pharmacy and Physician. Plan of care discussed. See clinical pathway and/or care plan for interventions and desired outcomes.

## 2017-08-21 NOTE — PROGRESS NOTES
Problem: Nutrition Deficit  Goal: *Optimize nutritional status  Nutrition Follow Up:  Reason for assessment: Length of stay day 4 (8/18)   Verbal consult received for diet education-low fat, GI soft diet (Dr. Ria Jarrell) + Diabetic, AHA-Low Fat, low Cholesterol (Dr. Ailyn Eason) (8/21)  Assessment:   Diet order(s): 8/21: Diabetic, AHA low fat, low cholesterol, 8/20:  GI soft, low fat, 8/18: Clear liquids; 8/14-8/18: NPO  Pt tolerated diet advancement to GI soft diet and then to Consistent CHO, AHA low fat, low cholesterol. Pt endorses that she does not eat a healthy diet at home but knows what consists of a healthy diet; pt eats mainly canned, boxed or convenience frozen foods. Pt does not eat fish, chicken or turkey; eats beef, pork and moore, lots of cheese, dried peas and beans, some yogurt; minimal sweetened soft drinks and tea (1 X week); main beverage is water. Pt states she had an eating disorder as a teenager. Pt lives with her mother and brother; states lots of junk food in the house. Pt had been on an exercise routine when she worked at [employer]; has not been exercising recently. Pertinent Labs:  Elevated lipase: 550 U/L (8/15), on admission- lipase 1793 U/L. Elevated triglyceride: -493 mg/dl- Triglyceride on admission 3595. Anthropometrics:Height: 5' 4\" (162.6 cm),  Weight: 104.5 kg (230 lb 6.4 oz), Weight Source: Bed, Body mass index is 39.55 kg/(m^2). BMI class ofObesity Class 11 . Bed weight today:  102.1 kg-BMI 38.6  Macronutrient needs:  EER:  5536-0957 kcal /day (13-18 kcal/kg BW)  EPR:  ~82 grams protein/day (1.5 grams/kg IBW) r/t acute pancreatitis  Intake/Comparative Standards: Per RD meal rounds: pt ate ~ 50% supper meal. 1 recorded intake at 50%; pt had been drinking Ensure Clear on each meal tray when diet permitted. Pt potentially meets ~ 40% estimated kcal goal and < 20% estimated protein goal (not including Ensure Clear which she had been consuming).   Nutrition Diagnosis:  Food and nutrition related knowledge deficit r/t lack of prior nutrition-related education as evidenced by no prior knowledge of need for food and nutrition related recommendations. Intervention:  1. Meals and snacks: Continue Consistent CHO, AHA low fat, low cholesterol diet; encouraged patient to consume GI soft diet per recommendations of Dr. Ant Kraft. 2.  Nutrition Supplement Therapy: Discontinued Ensure Clear on all meal trays. 3.  Education:  Provided patient with \"My Meal Plan\" with 4 CHO servings/meal + recommendations for a low fat, low cholesterol, 2 gm Na, GI soft diet. Discussed sources of CHO, food purchasing/ preparation, portion control, reading food labels and ways to increase consumption of daily protein; pt does not eat much meats; does eat dried peas and beans; consuming skim milk vs higher fat milk; may add sugar free Keavy Instant Breakfast to skim milk. With MD permission, encouraged patient to start back on an exercise regime to promote weight loss. Pt verbalized understanding; anticipate good compliance. Pt may benefit from continued nutrition education past discharge. 4.  Coordination of Nutrition Care:  Interdisciplinary Rounds  5. Nutrition Discharge Plans: Consistent CHO, AHA Low fat, low Cholesterol, GI soft diet. Pt to check with MD r/t length of time GI restrictions indicated.        Idania Herrera, [address], [facility] [address], MPH  [phone]

## 2017-08-21 NOTE — PROGRESS NOTES
Problem: Falls - Risk of  Goal: *Absence of Falls  Document Raj Fall Risk and appropriate interventions in the flowsheet.    Outcome: Progressing Towards Goal  Fall Risk Interventions:              Medication Interventions: Patient to call before getting OOB, Teach patient to arise slowly     Elimination Interventions: Call light in reach

## 2017-08-21 NOTE — PROGRESS NOTES
Gave patient 4mg zofran and upon reassessment it was successful in preventing any nausea from the dilaudid.

## 2017-08-21 NOTE — PROGRESS NOTES
Pt c/o 7/10 pain and requested zofran to prevent nausea administered percocet 5 mg PO and zofran IV per MD order, will continue to monitor.

## 2017-08-21 NOTE — PROGRESS NOTES
Hospitalist Progress Note    2017  Admit Date: 2017  7:45 PM   NAME: Trevon Jon   :  1990   DOS:              17  MRN:  [id number]   Attending: Dakota Lee MD  PCP:  None  Treatment Team: Attending Provider: Dixie Teixeira MD; Utilization Review: Charity Hernandez RN    Full Code     SUBJECTIVE:          Trevon Jon 27 yr female admitted for acute pancreatitis secondary to hypertriglyceridemia. Initially > 3000 then 1000. She was started on an insulin drip and initially declined plasmapharesis. The patient was tachycardic and complaining of severe abdominal pain and decreased urine ouput and febrile. Seen by GI who have increased her pain meds, ivfls. Repeat ct showed worsening changes c/w severe pancreatitis. She continued to be tachycardic with severe pain. Her triglyceride levels eventually trended down to to 400's -500's. Her pain improved. She was advanced to clears and then to a GI soft- low fat bland diet. Gi d/w patient discharge if stable on Tuesday. She has been requiring IVFL dilaudid at night for pain. We will try just po pain meds tonight. Stop ivfls and insulin drip. If stable and ok with GI she can hopefully go home tomorrow. She was previously given a work letter but has medical forms that she wants signed at discharge for work. She has no PCP and will need a referral. Also need referral to Endocrine. 10+ ROS reviewed and negative except for positive in HPI.    No Known Allergies  Current Facility-Administered Medications   Medication Dose Route Frequency    metroNIDAZOLE (FLAGYL) IVPB premix 500 mg  500 mg IntraVENous Q12H    dextrose 5% infusion  25 mL/hr IntraVENous CONTINUOUS    bisacodyl (DULCOLAX) suppository 10 mg  10 mg Rectal DAILY PRN    pantoprazole (PROTONIX) 40 mg in sodium chloride 0.9 % 10 mL injection  40 mg IntraVENous DAILY    HYDROmorphone (PF) (DILAUDID) injection 2 mg  2 mg IntraVENous Q3H PRN    HYDROmorphone (PF) (DILAUDID) injection 1 mg  1 mg IntraVENous Q3H PRN    sodium chloride (NS) flush 5-10 mL  5-10 mL IntraVENous Q8H    sodium chloride (NS) flush 5-10 mL  5-10 mL IntraVENous PRN    0.9% sodium chloride infusion  150 mL/hr IntraVENous CONTINUOUS    acetaminophen (TYLENOL) tablet 650 mg  650 mg Oral Q4H PRN    oxyCODONE-acetaminophen (PERCOCET) 5-325 mg per tablet 1 Tab  1 Tab Oral Q4H PRN    naloxone (NARCAN) injection 0.4 mg  0.4 mg IntraVENous PRN    ondansetron (ZOFRAN) injection 4 mg  4 mg IntraVENous Q4H PRN    cefTRIAXone (ROCEPHIN) 1 g in 0.9% sodium chloride (MBP/ADV) 50 mL  1 g IntraVENous Q24H    famotidine (PF) (PEPCID) 20 mg in sodium chloride 0.9 % 10 mL injection  20 mg IntraVENous Q12H    docusate sodium (COLACE) capsule 100 mg  100 mg Oral DAILY    bisacodyl (DULCOLAX) tablet 5 mg  5 mg Oral DAILY PRN    Or    magnesium hydroxide (MILK OF MAGNESIA) 400 mg/5 mL oral suspension 30 mL  30 mL Oral DAILY PRN    promethazine (PHENERGAN) with saline injection 12.5 mg  12.5 mg IntraVENous Q6H PRN    gemfibrozil (LOPID) tablet 600 mg  600 mg Oral ACB&D    insulin lispro (HUMALOG) injection   SubCUTAneous Q6H    dextrose 40% (GLUTOSE) oral gel 1 Tube  15 g Oral PRN    glucagon (GLUCAGEN) injection 1 mg  1 mg IntraMUSCular PRN    dextrose (D50W) injection syrg 12.5-25 g  25-50 mL IntraVENous PRN    insulin regular (NOVOLIN R, HUMULIN R) 100 Units in 0.9% sodium chloride 100 mL infusion  1-10 Units/hr IntraVENous CONTINUOUS         Immunization History   Administered Date(s) Administered    Influenza Vaccine 2014     Objective:     Patient Vitals for the past 24 hrs:   Temp Pulse Resp BP SpO2   17 1050 98.4 °F (36.9 °C) 99 16 124/79 96 %   17 0745 97.9 °F (36.6 °C) 89 16 120/70 99 %   17 0250 97.6 °F (36.4 °C) 90 10 121/54 98 %   17 2326 96.4 °F (35.8 °C) 88 18 121/77 96 %   17 1811 97.1 °F (36.2 °C) (!) 101 19 138/85 100 %     Temp (24hrs), Av.5 °F (36.4 °C), Min:96.4 °F (35.8 °C), Max:98.4 °F (36.9 °C)    Oxygen Therapy  O2 Sat (%): 96 % (08/21/17 1050)  Pulse via Oximetry: 106 beats per minute (08/19/17 2052)  O2 Device: Room air (08/21/17 0745)  O2 Flow Rate (L/min): 0 l/min (08/18/17 0854)  Oxygen Therapy  O2 Sat (%): 96 % (08/21/17 1050)  Pulse via Oximetry: 106 beats per minute (08/19/17 2052)  O2 Device: Room air (08/21/17 0745)  O2 Flow Rate (L/min): 0 l/min (08/18/17 0854)    Physical Exam:  General:         Alert, cooperative, no distress   HEENT:               NCAT. No obvious deformity. Nares normal. No drainage  Lungs:  CTABL. No wheezing/rhonchi/rales  Cardiovascular:   RRR. No m/r/g. No pedal edema b/l. +2 PT/DT pulses b/l. Abdomen:       S/nt/ mild tenderness hypotonic   Bowel sounds normal. .   Skin:         No rashes or lesions. Not Jaundiced  Neurologic:    AAOx3. CN II- XII grossly WNL. No gross focal deficit. Moves all extremities. Normal sensory. Normal gait. Psychiatric:         Good mood. Normal affect. Denies any SI/HI.                DIAGNOSTIC STUDIES      Data Review:   Recent Results (from the past 24 hour(s))   GLUCOSE, POC    Collection Time: 08/20/17 11:20 PM   Result Value Ref Range    Glucose (POC) 93 65 - 740 mg/dL   METABOLIC PANEL, BASIC    Collection Time: 08/21/17  6:20 AM   Result Value Ref Range    Sodium 138 136 - 145 mmol/L    Potassium 4.6 3.5 - 5.1 mmol/L    Chloride 105 98 - 107 mmol/L    CO2 19 (L) 21 - 32 mmol/L    Anion gap 14 7 - 16 mmol/L    Glucose 84 65 - 100 mg/dL    BUN 3 (L) 6 - 23 MG/DL    Creatinine 0.49 (L) 0.6 - 1.0 MG/DL    GFR est AA >60 >60 ml/min/1.73m2    GFR est non-AA >60 >60 ml/min/1.73m2    Calcium 8.6 8.3 - 10.4 MG/DL   TRIGLYCERIDE    Collection Time: 08/21/17  6:20 AM   Result Value Ref Range    Triglyceride 493 (H) 35 - 150 MG/DL   GLUCOSE, POC    Collection Time: 08/21/17  6:24 AM   Result Value Ref Range    Glucose (POC) 87 65 - 100 mg/dL   CBC WITH AUTOMATED DIFF    Collection Time: 08/21/17  8:40 AM   Result Value Ref Range    WBC 12.8 (H) 4.3 - 11.1 K/uL    RBC 4.20 4.05 - 5.25 M/uL    HGB 11.3 (L) 11.7 - 15.4 g/dL    HCT 35.2 (L) 35.8 - 46.3 %    MCV 83.8 79.6 - 97.8 FL    MCH 26.9 26.1 - 32.9 PG    MCHC 32.1 31.4 - 35.0 g/dL    RDW 15.3 (H) 11.9 - 14.6 %    PLATELET 537 940 - 343 K/uL    MPV 10.2 (L) 10.8 - 14.1 FL    DF AUTOMATED      NEUTROPHILS 74 43 - 78 %    LYMPHOCYTES 14 13 - 44 %    MONOCYTES 8 4.0 - 12.0 %    EOSINOPHILS 2 0.5 - 7.8 %    BASOPHILS 0 0.0 - 2.0 %    IMMATURE GRANULOCYTES 1.8 0.0 - 5.0 %    ABS. NEUTROPHILS 9.5 (H) 1.7 - 8.2 K/UL    ABS. LYMPHOCYTES 1.7 0.5 - 4.6 K/UL    ABS. MONOCYTES 1.1 0.1 - 1.3 K/UL    ABS. EOSINOPHILS 0.2 0.0 - 0.8 K/UL    ABS. BASOPHILS 0.0 0.0 - 0.2 K/UL    ABS. IMM. GRANS. 0.2 0.0 - 0.5 K/UL   GLUCOSE, POC    Collection Time: 08/21/17 11:14 AM   Result Value Ref Range    Glucose (POC) 97 65 - 100 mg/dL   GLUCOSE, POC    Collection Time: 08/21/17  3:20 PM   Result Value Ref Range    Glucose (POC) 103 (H) 65 - 100 mg/dL       All Micro Results     Procedure Component Value Units Date/Time    CULTURE, URINE [[id number]] Collected:  08/13/17 2005    Order Status:  Completed Specimen:  Urine from Clean catch Updated:  08/16/17 0702     Special Requests: NO SPECIAL REQUESTS        Culture result:         10,000 to 50,000 COLONIES/mL MIXED SKIN KENNETH ISOLATED          Imaging [facility] /Studies:    CXR Results  (Last 48 hours)    None        CT Results  (Last 48 hours)    None        No results found. No results found for this visit on 08/13/17.     Labs and Studies from previous 24 hours have been personally reviewed by myself Adolfo Problems    Diagnosis Date Noted    Acute pancreatitis 08/14/2017    Leukocytosis 08/14/2017    Nausea and vomiting 08/14/2017    UTI (urinary tract infection) 08/14/2017    GERD (gastroesophageal reflux disease) 08/14/2017     Hospital Problems as of 8/21/2017  Never Reviewed Codes Class Noted - Resolved POA    * (Principal)Acute pancreatitis ICD-10-CM: K85.90  ICD-9-CM: 729.3  8/14/2017 - Present Yes        Leukocytosis ICD-10-CM: S89.249  ICD-9-CM: 288.60  8/14/2017 - Present Yes        Nausea and vomiting ICD-10-CM: R11.2  ICD-9-CM: 787.01  8/14/2017 - Present Yes        UTI (urinary tract infection) ICD-10-CM: N39.0  ICD-9-CM: 599.0  8/14/2017 - Present Yes        GERD (gastroesophageal reflux disease) ICD-10-CM: K21.9  ICD-9-CM: 530.81  8/14/2017 - Present Yes              Plan:    Pancreatitis    Due to elevated triglyceride  Continue current mgt with Gemfibrozil 600mg    Discontinue insulin drip  Discontinue ivfls  GI input appreciated  Continue low fat bland gi diet      Likely Sepsis  - resolving  -Dc abx hermila- would have been 7 days of abx      Tachycardia  - resolving      Decreased urine outpt-  resolved    Obesity   Weight loss advised    Borderline diabetic / prediabetic  Discuss the need to loose weight   Pt may benefit from metformin after dc to aid with weight loss will need to d/w gi prior to discharge      Type I hyperlipidemia? No current family history possible autosomal recessive   Defer to opt work up - pt will likely need endocrine referral       DVT Prophylaxis: heparin  CODE Status: full  Plan of Care Discussed with: patient. Care team.  Medical Risk: moderate pt is now moderate risk given her severe pancreatitis  Disposition: tomorrow if medically stable and ok with GI.       Deirdre Hackett MD  08/21/17

## 2017-08-21 NOTE — PROGRESS NOTES
GI DAILY PROGRESS NOTE    Admit Date:  8/13/2017    Today's Date:  8/21/2017    CC:  pancreatitis    Subjective:     Patient feels much better. Tolerating solid food. +BMs. Pain improved. Ambulating.     Medications:   Current Facility-Administered Medications   Medication Dose Route Frequency    metroNIDAZOLE (FLAGYL) IVPB premix 500 mg  500 mg IntraVENous Q12H    dextrose 5% infusion  25 mL/hr IntraVENous CONTINUOUS    bisacodyl (DULCOLAX) suppository 10 mg  10 mg Rectal DAILY PRN    pantoprazole (PROTONIX) 40 mg in sodium chloride 0.9 % 10 mL injection  40 mg IntraVENous DAILY    HYDROmorphone (PF) (DILAUDID) injection 2 mg  2 mg IntraVENous Q3H PRN    HYDROmorphone (PF) (DILAUDID) injection 1 mg  1 mg IntraVENous Q3H PRN    sodium chloride (NS) flush 5-10 mL  5-10 mL IntraVENous Q8H    sodium chloride (NS) flush 5-10 mL  5-10 mL IntraVENous PRN    0.9% sodium chloride infusion  150 mL/hr IntraVENous CONTINUOUS    acetaminophen (TYLENOL) tablet 650 mg  650 mg Oral Q4H PRN    oxyCODONE-acetaminophen (PERCOCET) 5-325 mg per tablet 1 Tab  1 Tab Oral Q4H PRN    naloxone (NARCAN) injection 0.4 mg  0.4 mg IntraVENous PRN    ondansetron (ZOFRAN) injection 4 mg  4 mg IntraVENous Q4H PRN    cefTRIAXone (ROCEPHIN) 1 g in 0.9% sodium chloride (MBP/ADV) 50 mL  1 g IntraVENous Q24H    famotidine (PF) (PEPCID) 20 mg in sodium chloride 0.9 % 10 mL injection  20 mg IntraVENous Q12H    docusate sodium (COLACE) capsule 100 mg  100 mg Oral DAILY    bisacodyl (DULCOLAX) tablet 5 mg  5 mg Oral DAILY PRN    Or    magnesium hydroxide (MILK OF MAGNESIA) 400 mg/5 mL oral suspension 30 mL  30 mL Oral DAILY PRN    promethazine (PHENERGAN) with saline injection 12.5 mg  12.5 mg IntraVENous Q6H PRN    gemfibrozil (LOPID) tablet 600 mg  600 mg Oral ACB&D    insulin lispro (HUMALOG) injection   SubCUTAneous Q6H    dextrose 40% (GLUTOSE) oral gel 1 Tube  15 g Oral PRN    glucagon (GLUCAGEN) injection 1 mg  1 mg IntraMUSCular PRN    dextrose (D50W) injection syrg 12.5-25 g  25-50 mL IntraVENous PRN    insulin regular (NOVOLIN R, HUMULIN R) 100 Units in 0.9% sodium chloride 100 mL infusion  1-10 Units/hr IntraVENous CONTINUOUS       Review of Systems:  ROS was obtained, with pertinent positives as listed above. No chest pain or SOB. Diet:      Objective:   Vitals:  Visit Vitals    /79 (BP 1 Location: Right arm, BP Patient Position: At rest;Supine; Head of bed elevated (Comment degrees))    Pulse 99    Temp 98.4 °F (36.9 °C)    Resp 16    Ht 5' 4\" (1.626 m)    Wt 102.1 kg (225 lb 1.4 oz)    LMP 07/10/2017    SpO2 96%    Breastfeeding No    BMI 38.64 kg/m2     Intake/Output:     08/19 1901 - 08/21 0700  In: 3562 [I.V.:3562]  Out: 200 [Urine:200]  Exam:  General appearance: alert, cooperative, no distress  Lungs: clear to auscultation bilaterally anteriorly  Heart: regular rate and rhythm  Abdomen: soft, mild epigastric TTP.  Bowel sounds normal. No masses, no organomegaly  Extremities: extremities normal, atraumatic, no cyanosis or edema  Neuro:  alert and oriented    Data Review (Labs):    Recent Labs      08/21/17   0840  08/21/17   0620  08/20/17   0552  08/19/17   1240   WBC  12.8*   --   12.7*  13.8*   HGB  11.3*   --   9.7*  9.7*   HCT  35.2*   --   30.4*  30.4*   PLT  221   --   199  187   MCV  83.8   --   83.1  83.1   NA   --   138  140  137   K   --   4.6  3.2*  3.2*   CL   --   105  104  102   CO2   --   19*  24  23   BUN   --   3*  4*  3*   CREA   --   0.49*  0.57*  0.58*   CA   --   8.6  8.2*  8.4   MG   --    --   2.0   --    GLU   --   84  92  120*       Assessment:     Principal Problem:    Acute pancreatitis (8/14/2017)    Active Problems:    Leukocytosis (8/14/2017)      Nausea and vomiting (8/14/2017)      UTI (urinary tract infection) (8/14/2017)      GERD (gastroesophageal reflux disease) (8/14/2017)      Reviewed images - significant fluid collections between 8/13 and 8/16 CT scans  Doubt active duct leak since condition has improved significantly    Plan:   Diabetic low fat diet  Encouraged diet adherence and lipid control  Pain control  May be able to DC soon    Will sign off and arrange follow-up. Thank you for allowing us to participate in the care of this patient. If we can be of any further assistance, please do not hesitate to contact us.     Teodoro Umanzor MD  [facility], [place]

## 2017-08-21 NOTE — PROGRESS NOTES
Pt assessment completed. Alert and oriented. Lungs CTA even and unlabored. Heart sounds regular. Abdomen soft and tender with active bowel sounds. Iv present and infusing without difficulty. Pt c/o mild pain but denies the need for pain medication at this time. All needs met will continue to monitor.

## 2017-08-21 NOTE — PROGRESS NOTES
Pt c/o 8/10 pain administered percocet 5mg PO and pt requested nausea medication administered zofran IV per MD order, will continue to monitor.

## 2017-08-21 NOTE — PROGRESS NOTES
Medicated patient for pain of 8 on 0-10 scale and gave zofran for nausea prevention. Will monitor for pain relief.

## 2017-08-22 VITALS
BODY MASS INDEX: 37.32 KG/M2 | WEIGHT: 218.6 LBS | SYSTOLIC BLOOD PRESSURE: 121 MMHG | OXYGEN SATURATION: 100 % | HEART RATE: 86 BPM | HEIGHT: 64 IN | DIASTOLIC BLOOD PRESSURE: 66 MMHG | TEMPERATURE: 98.2 F | RESPIRATION RATE: 18 BRPM

## 2017-08-22 LAB
ANION GAP SERPL CALC-SCNC: 7 MMOL/L (ref 7–16)
BASOPHILS # BLD: 0 K/UL (ref 0–0.2)
BASOPHILS NFR BLD: 0 % (ref 0–2)
BUN SERPL-MCNC: 5 MG/DL (ref 6–23)
CALCIUM SERPL-MCNC: 8.9 MG/DL (ref 8.3–10.4)
CHLORIDE SERPL-SCNC: 101 MMOL/L (ref 98–107)
CO2 SERPL-SCNC: 29 MMOL/L (ref 21–32)
CREAT SERPL-MCNC: 0.61 MG/DL (ref 0.6–1)
DIFFERENTIAL METHOD BLD: ABNORMAL
EOSINOPHIL # BLD: 0.2 K/UL (ref 0–0.8)
EOSINOPHIL NFR BLD: 1 % (ref 0.5–7.8)
ERYTHROCYTE [DISTWIDTH] IN BLOOD BY AUTOMATED COUNT: 15.1 % (ref 11.9–14.6)
GLUCOSE BLD STRIP.AUTO-MCNC: 110 MG/DL (ref 65–100)
GLUCOSE BLD STRIP.AUTO-MCNC: 111 MG/DL (ref 65–100)
GLUCOSE SERPL-MCNC: 114 MG/DL (ref 65–100)
HCT VFR BLD AUTO: 31.9 % (ref 35.8–46.3)
HGB BLD-MCNC: 10 G/DL (ref 11.7–15.4)
IMM GRANULOCYTES # BLD: 0.3 K/UL (ref 0–0.5)
IMM GRANULOCYTES NFR BLD: 2.3 % (ref 0–5)
LYMPHOCYTES # BLD: 2 K/UL (ref 0.5–4.6)
LYMPHOCYTES NFR BLD: 16 % (ref 13–44)
MCH RBC QN AUTO: 26.1 PG (ref 26.1–32.9)
MCHC RBC AUTO-ENTMCNC: 31.3 G/DL (ref 31.4–35)
MCV RBC AUTO: 83.3 FL (ref 79.6–97.8)
MONOCYTES # BLD: 1.1 K/UL (ref 0.1–1.3)
MONOCYTES NFR BLD: 9 % (ref 4–12)
NEUTS SEG # BLD: 9 K/UL (ref 1.7–8.2)
NEUTS SEG NFR BLD: 72 % (ref 43–78)
PLATELET # BLD AUTO: 239 K/UL (ref 150–450)
PMV BLD AUTO: 10.1 FL (ref 10.8–14.1)
POTASSIUM SERPL-SCNC: 3.8 MMOL/L (ref 3.5–5.1)
RBC # BLD AUTO: 3.83 M/UL (ref 4.05–5.25)
SODIUM SERPL-SCNC: 137 MMOL/L (ref 136–145)
TRIGL SERPL-MCNC: 545 MG/DL (ref 35–150)
WBC # BLD AUTO: 12.5 K/UL (ref 4.3–11.1)

## 2017-08-22 PROCEDURE — 74011250637 HC RX REV CODE- 250/637: Performed by: HOSPITALIST

## 2017-08-22 PROCEDURE — 85025 COMPLETE CBC W/AUTO DIFF WBC: CPT | Performed by: INTERNAL MEDICINE

## 2017-08-22 PROCEDURE — 80048 BASIC METABOLIC PNL TOTAL CA: CPT | Performed by: INTERNAL MEDICINE

## 2017-08-22 PROCEDURE — 74011250637 HC RX REV CODE- 250/637: Performed by: INTERNAL MEDICINE

## 2017-08-22 PROCEDURE — 36415 COLL VENOUS BLD VENIPUNCTURE: CPT | Performed by: INTERNAL MEDICINE

## 2017-08-22 PROCEDURE — 84478 ASSAY OF TRIGLYCERIDES: CPT | Performed by: INTERNAL MEDICINE

## 2017-08-22 PROCEDURE — 82962 GLUCOSE BLOOD TEST: CPT

## 2017-08-22 RX ORDER — RANITIDINE 150 MG/1
150 TABLET, FILM COATED ORAL 2 TIMES DAILY
Qty: 60 TAB | Refills: 0 | Status: SHIPPED | OUTPATIENT
Start: 2017-08-22 | End: 2017-08-28 | Stop reason: ALTCHOICE

## 2017-08-22 RX ORDER — ONDANSETRON 4 MG/1
4 TABLET, FILM COATED ORAL
Qty: 30 TAB | Refills: 0 | Status: SHIPPED | OUTPATIENT
Start: 2017-08-22 | End: 2017-09-26

## 2017-08-22 RX ORDER — PANTOPRAZOLE SODIUM 40 MG/1
40 TABLET, DELAYED RELEASE ORAL
Status: DISCONTINUED | OUTPATIENT
Start: 2017-08-22 | End: 2017-08-22 | Stop reason: HOSPADM

## 2017-08-22 RX ORDER — METRONIDAZOLE 500 MG/1
500 TABLET ORAL 2 TIMES DAILY
Qty: 18 TAB | Refills: 0 | Status: SHIPPED | OUTPATIENT
Start: 2017-08-22 | End: 2017-09-26

## 2017-08-22 RX ORDER — CIPROFLOXACIN 500 MG/1
500 TABLET ORAL EVERY 12 HOURS
Status: DISCONTINUED | OUTPATIENT
Start: 2017-08-22 | End: 2017-08-22 | Stop reason: HOSPADM

## 2017-08-22 RX ORDER — SAME BUTANEDISULFONATE/BETAINE 400-600 MG
250 POWDER IN PACKET (EA) ORAL 2 TIMES DAILY
Qty: 14 CAP | Refills: 0 | Status: SHIPPED | OUTPATIENT
Start: 2017-08-22 | End: 2017-08-29

## 2017-08-22 RX ORDER — CIPROFLOXACIN 500 MG/1
500 TABLET ORAL EVERY 12 HOURS
Qty: 12 TAB | Refills: 0 | Status: SHIPPED | OUTPATIENT
Start: 2017-08-22 | End: 2017-08-28 | Stop reason: ALTCHOICE

## 2017-08-22 RX ORDER — GEMFIBROZIL 600 MG/1
600 TABLET, FILM COATED ORAL
Qty: 60 TAB | Refills: 0 | Status: SHIPPED | OUTPATIENT
Start: 2017-08-22 | End: 2017-08-28 | Stop reason: ALTCHOICE

## 2017-08-22 RX ORDER — OXYCODONE HYDROCHLORIDE 5 MG/1
5 TABLET ORAL
Qty: 30 TAB | Refills: 0 | Status: SHIPPED | OUTPATIENT
Start: 2017-08-22 | End: 2017-09-26

## 2017-08-22 RX ADMIN — OXYCODONE HYDROCHLORIDE AND ACETAMINOPHEN 1 TABLET: 5; 325 TABLET ORAL at 03:21

## 2017-08-22 RX ADMIN — FAMOTIDINE 20 MG: 20 TABLET ORAL at 08:25

## 2017-08-22 RX ADMIN — DOCUSATE SODIUM 100 MG: 100 CAPSULE, LIQUID FILLED ORAL at 08:25

## 2017-08-22 RX ADMIN — GEMFIBROZIL 600 MG: 600 TABLET ORAL at 08:25

## 2017-08-22 RX ADMIN — METRONIDAZOLE 500 MG: 500 TABLET ORAL at 05:38

## 2017-08-22 RX ADMIN — PANTOPRAZOLE SODIUM 40 MG: 40 TABLET, DELAYED RELEASE ORAL at 09:26

## 2017-08-22 RX ADMIN — CIPROFLOXACIN 500 MG: 500 TABLET, FILM COATED ORAL at 09:27

## 2017-08-22 NOTE — PROGRESS NOTES
Pt assessment completed. Alert and oriented. Lungs CTA diminished in bases. Heart sounds regular. Abdomen soft and tender with active bowel sounds. No IV present okay per MD. PT c/o 3/10 pain, denies pain needs at this time, all needs met will continue to monitor.

## 2017-08-22 NOTE — DISCHARGE INSTRUCTIONS
DISCHARGE SUMMARY from Nurse    The following personal items are in your possession at time of discharge:    Dental Appliances: None  Visual Aid: None     Home Medications: Kept at bedside  Jewelry: Watch  Clothing: Footwear, Pajamas, Undergarments  Other Valuables: Cell Phone             PATIENT INSTRUCTIONS:    After general anesthesia or intravenous sedation, for 24 hours or while taking prescription Narcotics:  · Limit your activities  · Do not drive and operate hazardous machinery  · Do not make important personal or business decisions  · Do  not drink alcoholic beverages  · If you have not urinated within 8 hours after discharge, please contact your surgeon on call. Report the following to your surgeon:  · Excessive pain, swelling, redness or odor of or around the surgical area  · Temperature over 100.5  · Nausea and vomiting lasting longer than 4 hours or if unable to take medications  · Any signs of decreased circulation or nerve impairment to extremity: change in color, persistent  numbness, tingling, coldness or increase pain  · Any questions        What to do at Home:  Recommended activity: Activity as tolerated, per MD    If you experience any of the following symptoms fever>101, pain unrelieved with medication, nausea/vomiting, shortness of breath, dizziness/fainting, chest pain. , please follow up with your doctor. *  Please give a list of your current medications to your Primary Care Provider. *  Please update this list whenever your medications are discontinued, doses are      changed, or new medications (including over-the-counter products) are added. *  Please carry medication information at all times in case of emergency situations. These are general instructions for a healthy lifestyle:    No smoking/ No tobacco products/ Avoid exposure to second hand smoke    Surgeon General's Warning:  Quitting smoking now greatly reduces serious risk to your health.     Obesity, smoking, and sedentary lifestyle greatly increases your risk for illness    A healthy diet, regular physical exercise & weight monitoring are important for maintaining a healthy lifestyle    You may be retaining fluid if you have a history of heart failure or if you experience any of the following symptoms:  Weight gain of 3 pounds or more overnight or 5 pounds in a week, increased swelling in our hands or feet or shortness of breath while lying flat in bed. Please call your doctor as soon as you notice any of these symptoms; do not wait until your next office visit. Recognize signs and symptoms of STROKE:    F-face looks uneven    A-arms unable to move or move unevenly    S-speech slurred or non-existent    T-time-call 911 as soon as signs and symptoms begin-DO NOT go       Back to bed or wait to see if you get better-TIME IS BRAIN. Warning Signs of HEART ATTACK     Call 911 if you have these symptoms:   Chest discomfort. Most heart attacks involve discomfort in the center of the chest that lasts more than a few minutes, or that goes away and comes back. It can feel like uncomfortable pressure, squeezing, fullness, or pain.  Discomfort in other areas of the upper body. Symptoms can include pain or discomfort in one or both arms, the back, neck, jaw, or stomach.  Shortness of breath with or without chest discomfort.  Other signs may include breaking out in a cold sweat, nausea, or lightheadedness. Don't wait more than five minutes to call 911 - MINUTES MATTER! Fast action can save your life. Calling 911 is almost always the fastest way to get lifesaving treatment. Emergency Medical Services staff can begin treatment when they arrive -- up to an hour sooner than if someone gets to the hospital by car. The discharge information has been reviewed with the patient. The patient verbalized understanding.     Discharge medications reviewed with the patient and appropriate educational materials and side effects teaching were provided. Pancreatitis: Care Instructions  Your Care Instructions    The pancreas is an organ behind the stomach. It makes hormones and enzymes to help your body digest food. But if these enzymes attack the pancreas, it can get inflamed. This is called pancreatitis. Most cases are caused by gallstones or by heavy alcohol use. If you take care of yourself at home, it will help you get better. It will also help you avoid more problems with your pancreas. Follow-up care is a key part of your treatment and safety. Be sure to make and go to all appointments, and call your doctor if you are having problems. It's also a good idea to know your test results and keep a list of the medicines you take. How can you care for yourself at home? · Drink clear liquids and eat bland foods until you feel better. [place] foods include rice, dry toast, and crackers. They also include bananas and applesauce. · Eat a low-fat diet until your doctor says your pancreas is healed. · Do not drink alcohol. Tell your doctor if you need help to quit. Counseling, support groups, and sometimes medicines can help you stay sober. · Be safe with medicines. Read and follow all instructions on the label. ¨ If the doctor gave you a prescription medicine for pain, take it as prescribed. ¨ If you are not taking a prescription pain medicine, ask your doctor if you can take an over-the-counter medicine. · If your doctor prescribed antibiotics, take them as directed. Do not stop taking them just because you feel better. You need to take the full course of antibiotics. · Get extra rest until you feel better. To prevent future problems with your pancreas  · Do not drink alcohol. · Tell your doctors and pharmacist that you've had pancreatitis. They can help you avoid medicines that may cause this problem again. When should you call for help?   Call your doctor now or seek immediate medical care if:  · You have new or severe belly pain. · You have a new or higher fever. · You can't keep fluid or medicines down. Watch closely for changes in your health, and be sure to contact your doctor if:  · The symptoms you had when you first started feeling sick come back. · You do not get better as expected. · You need help to stop drinking alcohol. Where can you learn more? Go to http://thee-kenna.info/. Enter M638 in the search box to learn more about \"Pancreatitis: Care Instructions. \"  Current as of: August 9, 2016  Content Version: 11.3  © 2080-4063 [employer]. Care instructions adapted under license by [employer] (which disclaims liability or warranty for this information). If you have questions about a medical condition or this instruction, always ask your healthcare professional. [address] any warranty or liability for your use of this information.

## 2017-08-22 NOTE — PROGRESS NOTES
Discharge instructions and prescriptions provided and explained to the pt. Med side effect sheet reviewed. Opportunity for questions provided. Pt is waiting on her ride. Instructed to call once ready to leave.

## 2017-08-22 NOTE — DISCHARGE SUMMARY
Discharge Summary     Patient: Lesa Ronquillo MRN: [id number]  SSN: [id number]    YOB: 1990  Age: 27 y.o. Sex: female       Admit Date: 8/13/2017    Discharge Date: 8/22/2017      Admission Diagnoses: Acute pancreatitis    Discharge Diagnoses:   Problem List as of 8/22/2017  Never Reviewed          Codes Class Noted - Resolved    * (Principal)Acute pancreatitis ICD-10-CM: K85.90  ICD-9-CM: 980.3  8/14/2017 - Present        Leukocytosis ICD-10-CM: D72.829  ICD-9-CM: 288.60  8/14/2017 - Present        Nausea and vomiting ICD-10-CM: R11.2  ICD-9-CM: 787.01  8/14/2017 - Present        UTI (urinary tract infection) ICD-10-CM: N39.0  ICD-9-CM: 599.0  8/14/2017 - Present        GERD (gastroesophageal reflux disease) ICD-10-CM: K21.9  ICD-9-CM: 530.81  8/14/2017 - Present        Allergic rhinitis due to pollen ICD-10-CM: J30.1  ICD-9-CM: 477.0  Unknown - Present        Chronic maxillary sinusitis ICD-10-CM: J32.0  ICD-9-CM: 473.0  Unknown - Present               Discharge Condition: [place] Course:     Lesa Ronquillo  is a 28 YO  female  With a PMH of obesity and GERD admitted on 8/13/17  for acute pancreatitis ( reported in abdominal CT scan)  secondary to hypertriglyceridemia. Initially  Her TG level was > 3000. She was started on an insulin drip and declined plasmapharesis. The patient was tachycardic and complaining of severe abdominal pain and decreased urine output and febrile and was considered to be septic due to her pancreatitis. She managed with IV antibiotics, she was also seen by GI ( recommendations appreciated). Repeat ct showed worsening changes c/w severe pancreatitis. Her triglyceride levels eventually trended down and her pain improved. She was advanced to clears and then to a GI soft- low fat bland diet which was well tolerated. Her insulin drip was stopped and she was switched to oral gemfibrozil.  Most likely she has herediary hypertriglyceridemia as her mother has also had TG induced pancreatitis. Her Hgb A1c was slightly elevated to 6.3 but her BS remained stable. She was seen by the dietitian and a low fat diet was recommended. She was advised to lose weight and to follow up with her PCP, an endocrinologist and GI at discharge. She will complete 14 days of antibiotics.         General:                         Alert, cooperative, no distress   HEENT:               NCAT. No obvious deformity. Nares normal. No drainage  Lungs:  CTABL. No wheezing/rhonchi/rales  Cardiovascular:   RRR. No m/r/g. No pedal edema b/l. +2 PT/DT pulses b/l. Abdomen:                      S/nt/ mild tenderness hypotonic   Bowel sounds normal. .   Skin:                                        No rashes or lesions. Not Jaundiced  Neurologic:                   AAOx3. CN II- XII grossly WNL. No gross focal deficit. Moves all extremities. Normal sensory. Normal gait. Psychiatric:         Good mood. Normal affect. Denies any SI/HI. Consults: Gastroenterology    Significant Diagnostic Studies: see hospital course     Disposition: home    Discharge Medications:   No current facility-administered medications for this encounter. Current Outpatient Prescriptions:     ondansetron hcl (ZOFRAN, AS HYDROCHLORIDE,) 4 mg tablet, Take 1 Tab by mouth every eight (8) hours as needed for Nausea., Disp: 30 Tab, Rfl: 0    oxyCODONE IR (ROXICODONE) 5 mg immediate release tablet, Take 1 Tab by mouth every six (6) hours as needed for Pain., Disp: 30 Tab, Rfl: 0    raNITIdine (ZANTAC) 150 mg tablet, Take 1 Tab by mouth two (2) times a day., Disp: 60 Tab, Rfl: 0    ciprofloxacin HCl (CIPRO) 500 mg tablet, Take 1 Tab by mouth every twelve (12) hours. , Disp: 12 Tab, Rfl: 0    gemfibrozil (LOPID) 600 mg tablet, Take 1 Tab by mouth Before breakfast and dinner., Disp: 60 Tab, Rfl: 0    metroNIDAZOLE (FLAGYL) 500 mg tablet, Take 1 Tab by mouth two (2) times a day., Disp: 18 Tab, Rfl: 0    Saccharomyces boulardii (FLORASTOR) 250 mg capsule, Take 1 Cap by mouth two (2) times a day for 7 days. , Disp: 14 Cap, Rfl: 0    cetirizine (ZYRTEC) 10 mg tablet, Take 10 mg by mouth daily. , Disp: , Rfl:     norgestimate-ethinyl estradiol (ORTHO TRI-CYCLEN, 28,) 0.18/0.215/0.25 mg-35 mcg (28) tab, Take 1 Tab by mouth daily. , Disp: , Rfl:       Activity: 1 month off duty recommended. Diet:low fat diet   Wound Care: None needed    Follow-up Appointments   Procedures    FOLLOW UP VISIT Appointment in: One Week PCP     PCP     Standing Status:   Standing     Number of Occurrences:   1     Order Specific Question:   Appointment in     Answer: One Week    FOLLOW UP VISIT Appointment in: One Week GI     GI     Standing Status:   Standing     Number of Occurrences:   1     Standing Expiration Date:   8/23/2017     Order Specific Question:   Appointment in     Answer: One Week     Order Specific Question:   Appt Date: Answer:   8/22/2017     Order Specific Question:   Appt Time:     Answer:   9:51 AM     Order Specific Question:   Office Contact:     Answer:   N/A     Order Specific Question:   What provider will pt be seeing:     Answer:   N/A    FOLLOW UP VISIT Appointment in: One Week Endocrinology. Will be a new patient to them,OFFICE WILL CALL PATIENT WITH APPT. Endocrinology. Will be a new patient to them,OFFICE WILL CALL PATIENT WITH APPT. Standing Status:   Standing     Number of Occurrences:   1     Standing Expiration Date:   8/23/2017     Order Specific Question:   Appointment in     Answer: One Week     Order Specific Question:   Appt Date:      Answer:   8/22/2017     Order Specific Question:   Appt Time:     Answer:   9:51 AM     Order Specific Question:   Office Contact:     Answer:   N/A     Order Specific Question:   What provider will pt be seeing:     Answer:   ANTHONY       Signed By: Traci Power MD     August 22, 2017

## 2017-08-22 NOTE — PROGRESS NOTES
Shift assessment completed via doc flow sheet. Patient is alert and oriented x 4. Respirations even and unlabored on room air. Lung sounds CTA bilaterally. Heart sounds S1, S2 auscultated and regular. Abdomen soft but tender with hypoactive bowel sounds. Pt has no IV at this time. Patient ambulates to bathroom ad candelaria. Patient denies pain and other needs at this time. Bed is locked and in low position. Bed rails x 3. Family at bedside. Patient is encouraged to call for assistance. Call light within reach.

## 2017-08-28 PROBLEM — R11.2 NAUSEA AND VOMITING: Status: RESOLVED | Noted: 2017-08-14 | Resolved: 2017-08-28

## 2017-08-28 PROBLEM — N39.0 UTI (URINARY TRACT INFECTION): Status: RESOLVED | Noted: 2017-08-14 | Resolved: 2017-08-28

## 2017-08-28 PROBLEM — R73.01 IFG (IMPAIRED FASTING GLUCOSE): Status: ACTIVE | Noted: 2017-08-28

## 2017-08-28 PROBLEM — D72.829 LEUKOCYTOSIS: Status: RESOLVED | Noted: 2017-08-14 | Resolved: 2017-08-28

## 2017-08-28 PROBLEM — E78.1 HYPERTRIGLYCERIDEMIA: Status: ACTIVE | Noted: 2017-08-28

## 2017-12-27 PROBLEM — E66.9 OBESITY (BMI 30-39.9): Status: ACTIVE | Noted: 2017-12-27

## 2018-06-27 PROBLEM — E66.01 OBESITY, MORBID (HCC): Status: ACTIVE | Noted: 2018-06-27

## 2019-02-28 PROBLEM — F41.9 ANXIETY: Status: ACTIVE | Noted: 2019-02-28

## 2019-02-28 PROBLEM — F50.81 BINGE EATING DISORDER: Status: ACTIVE | Noted: 2019-02-28

## 2019-03-13 PROBLEM — E11.9 TYPE 2 DIABETES MELLITUS WITHOUT COMPLICATION, WITHOUT LONG-TERM CURRENT USE OF INSULIN (HCC): Status: ACTIVE | Noted: 2019-03-13

## 2024-02-08 ENCOUNTER — APPOINTMENT (RX ONLY)
Age: 34
Setting detail: DERMATOLOGY
End: 2024-02-08

## 2024-02-08 DIAGNOSIS — D22 MELANOCYTIC NEVI: ICD-10-CM

## 2024-02-08 DIAGNOSIS — Z71.89 OTHER SPECIFIED COUNSELING: ICD-10-CM

## 2024-02-08 PROBLEM — D22.5 MELANOCYTIC NEVI OF TRUNK: Status: ACTIVE | Noted: 2024-02-08

## 2024-02-08 PROBLEM — D22.71 MELANOCYTIC NEVI OF RIGHT LOWER LIMB, INCLUDING HIP: Status: ACTIVE | Noted: 2024-02-08

## 2024-02-08 PROCEDURE — 99203 OFFICE O/P NEW LOW 30 MIN: CPT

## 2024-02-08 PROCEDURE — ? PHOTO-DOCUMENTATION

## 2024-02-08 PROCEDURE — ? COUNSELING

## 2024-02-08 ASSESSMENT — LOCATION SIMPLE DESCRIPTION DERM
LOCATION SIMPLE: LEFT UPPER BACK
LOCATION SIMPLE: RIGHT THIGH

## 2024-02-08 ASSESSMENT — LOCATION DETAILED DESCRIPTION DERM
LOCATION DETAILED: LEFT SUPERIOR UPPER BACK
LOCATION DETAILED: RIGHT ANTERIOR DISTAL THIGH

## 2024-02-08 ASSESSMENT — LOCATION ZONE DERM
LOCATION ZONE: LEG
LOCATION ZONE: TRUNK